# Patient Record
Sex: MALE | Race: WHITE | HISPANIC OR LATINO | Employment: FULL TIME | URBAN - METROPOLITAN AREA
[De-identification: names, ages, dates, MRNs, and addresses within clinical notes are randomized per-mention and may not be internally consistent; named-entity substitution may affect disease eponyms.]

---

## 2017-10-03 ENCOUNTER — HOSPITAL ENCOUNTER (EMERGENCY)
Facility: MEDICAL CENTER | Age: 27
End: 2017-10-03
Attending: EMERGENCY MEDICINE
Payer: COMMERCIAL

## 2017-10-03 VITALS
WEIGHT: 237.88 LBS | OXYGEN SATURATION: 95 % | DIASTOLIC BLOOD PRESSURE: 84 MMHG | BODY MASS INDEX: 30.53 KG/M2 | RESPIRATION RATE: 16 BRPM | TEMPERATURE: 98.5 F | SYSTOLIC BLOOD PRESSURE: 176 MMHG | HEIGHT: 74 IN | HEART RATE: 60 BPM

## 2017-10-03 VITALS
TEMPERATURE: 98.7 F | RESPIRATION RATE: 16 BRPM | OXYGEN SATURATION: 96 % | HEIGHT: 74 IN | HEART RATE: 55 BPM | WEIGHT: 236.77 LBS | BODY MASS INDEX: 30.39 KG/M2 | DIASTOLIC BLOOD PRESSURE: 69 MMHG | SYSTOLIC BLOOD PRESSURE: 135 MMHG

## 2017-10-03 DIAGNOSIS — M54.31 RIGHT SIDED SCIATICA: ICD-10-CM

## 2017-10-03 DIAGNOSIS — M54.42 ACUTE RIGHT-SIDED LOW BACK PAIN WITH LEFT-SIDED SCIATICA: ICD-10-CM

## 2017-10-03 DIAGNOSIS — M54.16 RIGHT LUMBAR RADICULOPATHY: ICD-10-CM

## 2017-10-03 PROCEDURE — 20552 NJX 1/MLT TRIGGER POINT 1/2: CPT

## 2017-10-03 PROCEDURE — A9270 NON-COVERED ITEM OR SERVICE: HCPCS | Performed by: EMERGENCY MEDICINE

## 2017-10-03 PROCEDURE — 700102 HCHG RX REV CODE 250 W/ 637 OVERRIDE(OP): Performed by: EMERGENCY MEDICINE

## 2017-10-03 PROCEDURE — 99283 EMERGENCY DEPT VISIT LOW MDM: CPT

## 2017-10-03 PROCEDURE — 700111 HCHG RX REV CODE 636 W/ 250 OVERRIDE (IP): Performed by: EMERGENCY MEDICINE

## 2017-10-03 PROCEDURE — 99284 EMERGENCY DEPT VISIT MOD MDM: CPT

## 2017-10-03 PROCEDURE — 700101 HCHG RX REV CODE 250: Performed by: EMERGENCY MEDICINE

## 2017-10-03 RX ORDER — CYCLOBENZAPRINE HCL 10 MG
10 TABLET ORAL ONCE
Status: COMPLETED | OUTPATIENT
Start: 2017-10-03 | End: 2017-10-03

## 2017-10-03 RX ORDER — LIDOCAINE 50 MG/G
1 PATCH TOPICAL EVERY 24 HOURS
Qty: 10 PATCH | Refills: 0 | Status: SHIPPED | OUTPATIENT
Start: 2017-10-03 | End: 2017-10-03

## 2017-10-03 RX ORDER — BUPIVACAINE HYDROCHLORIDE 2.5 MG/ML
10 INJECTION, SOLUTION EPIDURAL; INFILTRATION; INTRACAUDAL ONCE
Status: COMPLETED | OUTPATIENT
Start: 2017-10-03 | End: 2017-10-03

## 2017-10-03 RX ORDER — OMEPRAZOLE 40 MG/1
40 CAPSULE, DELAYED RELEASE ORAL DAILY
Qty: 10 CAP | Refills: 0 | Status: SHIPPED | OUTPATIENT
Start: 2017-10-03 | End: 2017-10-03

## 2017-10-03 RX ORDER — HYDROCODONE BITARTRATE AND ACETAMINOPHEN 5; 325 MG/1; MG/1
1 TABLET ORAL ONCE
Status: COMPLETED | OUTPATIENT
Start: 2017-10-03 | End: 2017-10-03

## 2017-10-03 RX ORDER — NAPROXEN 250 MG/1
500 TABLET ORAL ONCE
Status: COMPLETED | OUTPATIENT
Start: 2017-10-03 | End: 2017-10-03

## 2017-10-03 RX ORDER — HYDROCODONE BITARTRATE AND ACETAMINOPHEN 5; 325 MG/1; MG/1
1-2 TABLET ORAL EVERY 4 HOURS PRN
Qty: 20 TAB | Refills: 0 | Status: SHIPPED | OUTPATIENT
Start: 2017-10-03 | End: 2017-10-16

## 2017-10-03 RX ORDER — LIDOCAINE 50 MG/G
PATCH TOPICAL
Status: COMPLETED
Start: 2017-10-03 | End: 2017-10-03

## 2017-10-03 RX ORDER — PREDNISONE 20 MG/1
40 TABLET ORAL DAILY
Qty: 8 TAB | Refills: 0 | Status: SHIPPED | OUTPATIENT
Start: 2017-10-03 | End: 2017-10-07

## 2017-10-03 RX ORDER — LIDOCAINE 50 MG/G
1 PATCH TOPICAL EVERY 24 HOURS
Status: DISCONTINUED | OUTPATIENT
Start: 2017-10-03 | End: 2017-10-03 | Stop reason: HOSPADM

## 2017-10-03 RX ORDER — CYCLOBENZAPRINE HCL 10 MG
10 TABLET ORAL 3 TIMES DAILY PRN
Qty: 18 TAB | Refills: 0 | Status: SHIPPED | OUTPATIENT
Start: 2017-10-03 | End: 2017-10-16

## 2017-10-03 RX ORDER — NAPROXEN 500 MG/1
500 TABLET ORAL 2 TIMES DAILY WITH MEALS
Qty: 20 TAB | Refills: 0 | Status: SHIPPED | OUTPATIENT
Start: 2017-10-03 | End: 2017-10-03

## 2017-10-03 RX ORDER — PREDNISONE 20 MG/1
50 TABLET ORAL ONCE
Status: COMPLETED | OUTPATIENT
Start: 2017-10-03 | End: 2017-10-03

## 2017-10-03 RX ADMIN — PREDNISONE 50 MG: 20 TABLET ORAL at 10:24

## 2017-10-03 RX ADMIN — NAPROXEN 500 MG: 250 TABLET ORAL at 05:10

## 2017-10-03 RX ADMIN — LIDOCAINE 1 PATCH: 50 PATCH CUTANEOUS at 05:30

## 2017-10-03 RX ADMIN — BUPIVACAINE HYDROCHLORIDE 10 ML: 2.5 INJECTION, SOLUTION EPIDURAL; INFILTRATION; INTRACAUDAL; PERINEURAL at 05:30

## 2017-10-03 RX ADMIN — HYDROCODONE BITARTRATE AND ACETAMINOPHEN 1 TABLET: 5; 325 TABLET ORAL at 10:25

## 2017-10-03 RX ADMIN — CYCLOBENZAPRINE HYDROCHLORIDE 10 MG: 10 TABLET, FILM COATED ORAL at 10:24

## 2017-10-03 ASSESSMENT — PAIN SCALES - GENERAL
PAINLEVEL_OUTOF10: 8
PAINLEVEL_OUTOF10: 4

## 2017-10-03 ASSESSMENT — ENCOUNTER SYMPTOMS
VOMITING: 0
SEIZURES: 0
FOCAL WEAKNESS: 0
BACK PAIN: 1
LOSS OF CONSCIOUSNESS: 0
SHORTNESS OF BREATH: 0
FEVER: 0
NAUSEA: 1
DIARRHEA: 0
FALLS: 0
TINGLING: 1
HEADACHES: 0
DIZZINESS: 0
ABDOMINAL PAIN: 0

## 2017-10-03 NOTE — ED NOTES
Pt discharged with written and oral discharge instructions. Pt verbalized understanding of all instructions given. All questions answered. Follow up information provided. Pt ambulating independently to lobby with wife. No s/s of distress.

## 2017-10-03 NOTE — ED NOTES
Pt D/C to home. VSS. D/C instructions, work notes, and prescriptions given to patient. Pt verbalizes understanding. Pt leaves ED with no acute changes, complaints or concerns. Pt ambulated out with a steady gait and all belongings.

## 2017-10-03 NOTE — ED PROVIDER NOTES
ED Provider Note    CHIEF COMPLAINT  Chief Complaint   Patient presents with   • Hip Pain     right hip started hurting yesterday while I was at work. No injury noted. Pain starts in hip and shoots down rt leg. Standing on it makes it worse, then it turns numb.   • Back Pain     x 2 weeks.      HPI    Primary care provider: Pcp Pt States None  Means of arrival: walk-in  History obtained from: patient and wife  History limited by: kelly Bowen is a 27 y.o. male who presents with low back pain and R leg pain.    Location: R low back    Quality: dull pain in the back, sharp pains down the leg    Severity: at worst moderate, at present mild    Duration: onset 2 weeks ago, insidious    Timing: intermittent, worse with activity and relieved with rest    Context: no injuries. Works at [employer] as a .     Modifying factors: minimal relief with ibuprofen yesterday. Worse with activity.     Associated signs/symptoms: associated with pain and paresthesias down the R leg that are transient. Pertinent negatives include no fevers, sweats, trauma, IVDU, incontinence, saddle anesthesia, weakness, falls.      REVIEW OF SYSTEMS  Constitutional: Negative for fever and chills.   HENT: Negative for headache.    Respiratory: Negative for cough or SOB.    Cardiovascular: Negative for chest pain or palpitations.   Gastrointestinal: Negative for nausea or vomiting.   Genitourinary: Negative for dysuria or incontinence or inability to void.  Musculoskeletal: + back pain and R leg pain  Skin: Negative for itching and rash.   Neurological: Negative for weakness, + occasional paresthesias    See HPI for further details. All other systems are negative.     PAST MEDICAL HISTORY   pt denies significant PMH.    PAST FAMILY HISTORY  History reviewed. No pertinent family history.    SOCIAL HISTORY  Social History     Social History Main Topics   • Smoking status: Never Smoker   • Smokeless tobacco: Never Used   • Alcohol use Yes       "Comment: rarely   • Drug use: No   • Sexual activity: Not on file     SURGICAL HISTORY  patient denies any surgical history    CURRENT MEDICATIONS  Home Medications     Reviewed by Geeta Mathew R.N. (Registered Nurse) on 10/03/17 at 0450  Med List Status: Complete   Medication Last Dose Status   ibuprofen (MOTRIN) 800 MG Tab prn Active              ALLERGIES  No Known Allergies    PHYSICAL EXAM  VITAL SIGNS: BP (!) 176/84   Pulse 60   Temp 36.9 °C (98.5 °F)   Resp 16   Ht 1.88 m (6' 2\")   Wt 107.9 kg (237 lb 14 oz)   SpO2 95%   BMI 30.54 kg/m²    Pulse ox interpretation: On room air, I interpret this pulse ox as normal.  Constitutional: Well developed, well nourished. No acute distress. Sitting up.   HEENT: Normocephalic, atraumatic.   Eyes:  Normal sclerae, nonicteric.  Neck: Supple, Full range of motion, nontender.  Chest/Pulmonary: Clear to ausculation bilaterally, no wheezes or rhonchi.  Cardio: Regular rate and rhythm, no murmur.   Abdomen: Soft, nontender, no rebound, guarding, or masses.  Back: No CVA tenderness, nontender midline, no step offs. R lumbar paraspinous minimal tenderness to palpation.  Musculoskeletal: No deformity, no edema, neurovascular intact.   Neuro: Clear speech, appropriate, cooperative, cranial nerves II-XII grossly intact. No saddle anesthesia. downstroking toes, proprioception intact b/l feet, no clonus, DTRs 2+ and symmetric.  Psych: Normal mood and affect.    DIAGNOSTIC STUDIES / PROCEDURES    PROCEDURES    Trigger Point Injection Procedure Note    Indication: pain control    Procedure: The patient was placed in the appropriate position and the area over the trigger point was prepped with chlorhexidine and draped in a sterile fashion. Injection was performed into the trigger point area using 0.25% bupivacaine, a total of 6cc was injected at 3 sites of Paraspinous point tenderness.. The injection site was covered with a 5% lidocaine patch.    The patient tolerated the " procedure well.    Complications: None    COURSE & MEDICAL DECISION MAKING    This is a 27 y.o. male who presents with back pain worsening for two weeks now with pain down the right leg and occasional paresthesias.    Differential Diagnosis includes but is not limited to:  Sciatica, fracture, cauda equina, vascular disease, abscess    ED Course:  No red flag symptoms suggesting malignancy, abscess, or cauda equina.   No trauma doubt fracture.  Plan local pain control with trigger points, lidocaine patch, and systemic relief with nsaids.   Naproxen PO tolerated.  Trigger point performed, see procedure note.  D/w patient my concerns for sciatica and probable disc bulge. Referred to a pcp to manage his elevated blood pressure, preventive health screening, and for possible referral for MRI if outpatient nsaids and lidocaine patch don't relieve his symptoms. Rxs for naproxen, lidocaine, and omeprazole for GI ppx given; work note provided for two days.   All questions answers, return precautions and red flag symptom warnings given.  Patient and wife both understand and will f/u with a PCP today.     Medications   lidocaine (LIDODERM) 5 % 1 Patch (not administered)   bupivacaine 0.25% (SENSORCAINE-MARCAINE) pf injection 10 mL (10 mL Injection Given 10/3/17 0530)   naproxen (NAPROSYN) tablet 500 mg (500 mg Oral Given 10/3/17 0510)     FINAL IMPRESSION  1. Acute right-sided low back pain with left-sided sciatica      PRESCRIPTIONS  New Prescriptions    LIDOCAINE (LIDODERM) 5 % PATCH    Apply 1 Patch to skin as directed every 24 hours.    NAPROXEN (NAPROSYN) 500 MG TAB    Take 1 Tab by mouth 2 times a day, with meals for 10 days.    OMEPRAZOLE (PRILOSEC) 40 MG DELAYED-RELEASE CAPSULE    Take 1 Cap by mouth every day for 10 days.     FOLLOW UP  [address]  [phone]    Schedule an appointment as soon as possible for a visit in 1 day      [facility], Emergency  Dept  [address]  [place] [address]  [phone]  Today  If symptoms worsen    -DISCHARGE-     Results, exam findings, clinical impression, presumed diagnosis, treatment options, and strict return precautions were discussed with the patient and family, and they verbalized understanding, agreed with, and appreciated the plan of care.    I personally reviewed and verified the patient's nursing notes, as well as past medical, surgical, and social history.     Portions of this record were made with voice recognition software.  Despite my review, spelling/grammar/context errors may still remain.  Interpretation of this chart should be taken in this context.    Electronically signed by Barrera Peters on 10/3/2017 at 5:43 AM.

## 2017-10-03 NOTE — ED NOTES
Med rec complete per Pt at bedside  Pt was not able to  medications  He was discharged with this AM  Allergies reviewed  No ABX in last 30 days

## 2017-10-03 NOTE — DISCHARGE INSTRUCTIONS
You were seen and evaluated in the Emergency Department at [facility] for:     Low back pain radiating down you right leg. This is likely sciatica. Please follow-up with a primary care doctor TODAY as you may need a referral for an MRI.     You received the following medications:    Naproxen, bupivacaine injection, lidocaine patch.    You received the following prescriptions:    Bupivacaine, omeprazole, lidocaine patches. Use these for ten days for symptom control.  ----------------------------    Please make sure to follow up with:    A primary care doctor today  Return to the ER immediately for weakness, fevers, persistent numbness, uncontrolled pain, or any other concerns.    Good luck, and feel better!  ----------------------------    We always encourage patients to return IMMEDIATELY if they have:  Increased or changing pain, passing out, fevers over 100.4 (taken in your mouth or rectally) for more than 2 days, redness or swelling of skin or tissues, feeling like your heart is beating fast, chest pain that is new or worsening, trouble breathing, feeling like your throat is closing up and can not breath, inability to walk, weakness of any part of your body, new dizziness, severe bleeding that won't stop from any part of your body, if you can't eat or drink, or if you have any other concerns.   If you feel worse, please know that you can always return with any questions, concerns, worse symptoms, or you are feeling unsafe. We certainly cannot say for sure that we have ruled out every illness or dangerous disease, but we feel that at this specific time, your exam, tests, and vital signs like heart rate and blood pressure are safe for discharge.         Sciatica  Sciatica is pain, weakness, numbness, or tingling along the path of the sciatic nerve. The nerve starts in the lower back and runs down the back of each leg. The nerve controls the muscles in the lower leg and in the back of the knee, while also  providing sensation to the back of the thigh, lower leg, and the sole of your foot. Sciatica is a symptom of another medical condition. For instance, nerve damage or certain conditions, such as a herniated disk or bone spur on the spine, pinch or put pressure on the sciatic nerve. This causes the pain, weakness, or other sensations normally associated with sciatica. Generally, sciatica only affects one side of the body.  CAUSES   · Herniated or slipped disc.  · Degenerative disk disease.  · A pain disorder involving the narrow muscle in the buttocks (piriformis syndrome).  · Pelvic injury or fracture.  · Pregnancy.  · Tumor (rare).  SYMPTOMS   Symptoms can vary from mild to very severe. The symptoms usually travel from the low back to the buttocks and down the back of the leg. Symptoms can include:  · Mild tingling or dull aches in the lower back, leg, or hip.  · Numbness in the back of the calf or sole of the foot.  · Burning sensations in the lower back, leg, or hip.  · Sharp pains in the lower back, leg, or hip.  · Leg weakness.  · Severe back pain inhibiting movement.  These symptoms may get worse with coughing, sneezing, laughing, or prolonged sitting or standing. Also, being overweight may worsen symptoms.  DIAGNOSIS   Your caregiver will perform a physical exam to look for common symptoms of sciatica. He or she may ask you to do certain movements or activities that would trigger sciatic nerve pain. Other tests may be performed to find the cause of the sciatica. These may include:  · Blood tests.  · X-rays.  · Imaging tests, such as an MRI or CT scan.  TREATMENT   Treatment is directed at the cause of the sciatic pain. Sometimes, treatment is not necessary and the pain and discomfort goes away on its own. If treatment is needed, your caregiver may suggest:  · Over-the-counter medicines to relieve pain.  · Prescription medicines, such as anti-inflammatory medicine, muscle relaxants, or narcotics.  · Applying  heat or ice to the painful area.  · Steroid injections to lessen pain, irritation, and inflammation around the nerve.  · Reducing activity during periods of pain.  · Exercising and stretching to strengthen your abdomen and improve flexibility of your spine. Your caregiver may suggest losing weight if the extra weight makes the back pain worse.  · Physical therapy.  · Surgery to eliminate what is pressing or pinching the nerve, such as a bone spur or part of a herniated disk.  HOME CARE INSTRUCTIONS   · Only take over-the-counter or prescription medicines for pain or discomfort as directed by your caregiver.  · Apply ice to the affected area for 20 minutes, 3-4 times a day for the first 48-72 hours. Then try heat in the same way.  · Exercise, stretch, or perform your usual activities if these do not aggravate your pain.  · Attend physical therapy sessions as directed by your caregiver.  · Keep all follow-up appointments as directed by your caregiver.  · Do not wear high heels or shoes that do not provide proper support.  · Check your mattress to see if it is too soft. A firm mattress may lessen your pain and discomfort.  SEEK IMMEDIATE MEDICAL CARE IF:   · You lose control of your bowel or bladder (incontinence).  · You have increasing weakness in the lower back, pelvis, buttocks, or legs.  · You have redness or swelling of your back.  · You have a burning sensation when you urinate.  · You have pain that gets worse when you lie down or awakens you at night.  · Your pain is worse than you have experienced in the past.  · Your pain is lasting longer than 4 weeks.  · You are suddenly losing weight without reason.  MAKE SURE YOU:  · Understand these instructions.  · Will watch your condition.  · Will get help right away if you are not doing well or get worse.     This information is not intended to replace advice given to you by your health care provider. Make sure you discuss any questions you have with your health  care provider.     Document Released: 12/12/2002 Document Revised: 06/18/2013 Document Reviewed: 04/28/2013  [employer] Interactive Patient Education ©2016 [employer]      Back Exercises  Back exercises help treat and prevent back injuries. The goal is to increase your strength in your belly (abdominal) and back muscles. These exercises can also help with flexibility. Start these exercises when told by your doctor.  HOME CARE  Back exercises include:  Pelvic Tilt.  · Lie on your back with your knees bent. Tilt your pelvis until the lower part of your back is against the floor. Hold this position 5 to 10 sec. Repeat this exercise 5 to 10 times.  Knee to Chest.  · Pull 1 knee up against your chest and hold for 20 to 30 seconds. Repeat this with the other knee. This may be done with the other leg straight or bent, whichever feels better. Then, pull both knees up against your chest.  Sit-Ups or Curl-Ups.  · Bend your knees 90 degrees. Start with tilting your pelvis, and do a partial, slow sit-up. Only lift your upper half 30 to 45 degrees off the floor. Take at least 2 to 3 seonds for each sit-up. Do not do sit-ups with your knees out straight. If partial sit-ups are difficult, simply do the above but with only tightening your belly (abdominal) muscles and holding it as told.  Hip-Lift.  · Lie on your back with your knees flexed 90 degrees. Push down with your feet and shoulders as you raise your hips 2 inches off the floor. Hold for 10 seconds, repeat 5 to 10 times.  Back Arches.  · Lie on your stomach. Prop yourself up on bent elbows. Slowly press on your hands, causing an arch in your low back. Repeat 3 to 5 times.  Shoulder-Lifts.  · Lie face down with arms beside your body. Keep hips and belly pressed to floor as you slowly lift your head and shoulders off the floor.  Do not overdo your exercises. Be careful in the beginning. Exercises may cause you some mild back discomfort. If the pain lasts for more than 15  minutes, stop the exercises until you see your doctor. Improvement with exercise for back problems is slow.      This information is not intended to replace advice given to you by your health care provider. Make sure you discuss any questions you have with your health care provider.     Document Released: 01/20/2012 Document Revised: 03/11/2013 Document Reviewed: 02/11/2016  [employer] Interactive Patient Education ©2016 [employer]

## 2017-10-03 NOTE — DISCHARGE INSTRUCTIONS
Sciatica  Sciatica is pain, weakness, numbness, or tingling along the path of the sciatic nerve. The nerve starts in the lower back and runs down the back of each leg. The nerve controls the muscles in the lower leg and in the back of the knee, while also providing sensation to the back of the thigh, lower leg, and the sole of your foot. Sciatica is a symptom of another medical condition. For instance, nerve damage or certain conditions, such as a herniated disk or bone spur on the spine, pinch or put pressure on the sciatic nerve. This causes the pain, weakness, or other sensations normally associated with sciatica. Generally, sciatica only affects one side of the body.  CAUSES   · Herniated or slipped disc.  · Degenerative disk disease.  · A pain disorder involving the narrow muscle in the buttocks (piriformis syndrome).  · Pelvic injury or fracture.  · Pregnancy.  · Tumor (rare).  SYMPTOMS   Symptoms can vary from mild to very severe. The symptoms usually travel from the low back to the buttocks and down the back of the leg. Symptoms can include:  · Mild tingling or dull aches in the lower back, leg, or hip.  · Numbness in the back of the calf or sole of the foot.  · Burning sensations in the lower back, leg, or hip.  · Sharp pains in the lower back, leg, or hip.  · Leg weakness.  · Severe back pain inhibiting movement.  These symptoms may get worse with coughing, sneezing, laughing, or prolonged sitting or standing. Also, being overweight may worsen symptoms.  DIAGNOSIS   Your caregiver will perform a physical exam to look for common symptoms of sciatica. He or she may ask you to do certain movements or activities that would trigger sciatic nerve pain. Other tests may be performed to find the cause of the sciatica. These may include:  · Blood tests.  · X-rays.  · Imaging tests, such as an MRI or CT scan.  TREATMENT   Treatment is directed at the cause of the sciatic pain. Sometimes, treatment is not necessary  and the pain and discomfort goes away on its own. If treatment is needed, your caregiver may suggest:  · Over-the-counter medicines to relieve pain.  · Prescription medicines, such as anti-inflammatory medicine, muscle relaxants, or narcotics.  · Applying heat or ice to the painful area.  · Steroid injections to lessen pain, irritation, and inflammation around the nerve.  · Reducing activity during periods of pain.  · Exercising and stretching to strengthen your abdomen and improve flexibility of your spine. Your caregiver may suggest losing weight if the extra weight makes the back pain worse.  · Physical therapy.  · Surgery to eliminate what is pressing or pinching the nerve, such as a bone spur or part of a herniated disk.  HOME CARE INSTRUCTIONS   · Only take over-the-counter or prescription medicines for pain or discomfort as directed by your caregiver.  · Apply ice to the affected area for 20 minutes, 3-4 times a day for the first 48-72 hours. Then try heat in the same way.  · Exercise, stretch, or perform your usual activities if these do not aggravate your pain.  · Attend physical therapy sessions as directed by your caregiver.  · Keep all follow-up appointments as directed by your caregiver.  · Do not wear high heels or shoes that do not provide proper support.  · Check your mattress to see if it is too soft. A firm mattress may lessen your pain and discomfort.  SEEK IMMEDIATE MEDICAL CARE IF:   · You lose control of your bowel or bladder (incontinence).  · You have increasing weakness in the lower back, pelvis, buttocks, or legs.  · You have redness or swelling of your back.  · You have a burning sensation when you urinate.  · You have pain that gets worse when you lie down or awakens you at night.  · Your pain is worse than you have experienced in the past.  · Your pain is lasting longer than 4 weeks.  · You are suddenly losing weight without reason.  MAKE SURE YOU:  · Understand these  instructions.  · Will watch your condition.  · Will get help right away if you are not doing well or get worse.     This information is not intended to replace advice given to you by your health care provider. Make sure you discuss any questions you have with your health care provider.     Document Released: 12/12/2002 Document Revised: 06/18/2013 Document Reviewed: 04/28/2013  [employer] Interactive Patient Education ©2016 [employer]

## 2017-10-03 NOTE — ED PROVIDER NOTES
ED Provider Note    ED Provider Note    Scribed for Kathi Hansen D.O. by Kathi Hansen. 10/3/2017, 9:34 AM.    Primary care provider: Pcp Pt States None  Means of arrival: POV  History obtained from: Patient/SO  History limited by: None    CHIEF COMPLAINT  Chief Complaint   Patient presents with   • Back Pain     with sciatica       HPI  Miguel Bowen is a 27 y.o. male who presents to the Emergency DepartmentWith his significant other with a chief complaint of right shin pain. Patient states he was here earlier today at approximately 4 in the morning with similar symptoms. He was discharged to home with medications but he has not yet been able to fill them. States he got home and could not find a comfortable position, pain worsened prompting him to come back to the emergency department. No fever. No history of IV drug use. No history of diabetes or hypertension. No immune compromising condition such as cancer, chemotherapy treatments. He has no past medical history and no past surgical history. Only takes ibuprofen. He states he injured his back about 10 years ago lifting weights while at school. As part of his work, he lifts heavy things on a daily basis and he does report that occasionally his back flares up on him. He states had right-sided back pain for about 2 weeks. Initially, he had radiation of the pain down his right thigh and now it's progressed to include his right calf and lower extremity. Now is complaining of some decreased sensation to his right thigh and circumferentially, from his knees distally. He denies any bowel or bladder incontinence or retention. No saddle paresthesias. He is able to walk. He has no weakness in his lower extremities. Patient complains of nausea yesterday but other than the symptoms described, his ROS was overall negative.    REVIEW OF SYSTEMS  Review of Systems   Constitutional: Negative for fever.   Respiratory: Negative for shortness of breath.    Cardiovascular:  "Negative for chest pain.   Gastrointestinal: Positive for nausea. Negative for abdominal pain, diarrhea and vomiting.   Genitourinary: Negative for dysuria.   Musculoskeletal: Positive for back pain. Negative for falls.   Skin: Negative for rash.   Neurological: Positive for tingling. Negative for dizziness, focal weakness, seizures, loss of consciousness and headaches.   All other systems reviewed and are negative.      PAST MEDICAL HISTORY   history of prior back injury about 10 years ago    SURGICAL HISTORY  patient denies any surgical history    SOCIAL HISTORY  Social History   Substance Use Topics   • Smoking status: Never Smoker   • Smokeless tobacco: Never Used   • Alcohol use Yes      Comment: rarely      History   Drug Use No       FAMILY HISTORY  No family history on file.    CURRENT MEDICATIONS  Home Medications     Reviewed by Valdez Reardon (Pharmacy Tech) on 10/03/17 at 0920  Med List Status: Complete   Medication Last Dose Status        Patient Logan Taking any Medications                       ALLERGIES  No Known Allergies    PHYSICAL EXAM  VITAL SIGNS: /69   Pulse (!) 55   Temp 37.1 °C (98.7 °F)   Resp 16   Ht 1.88 m (6' 2\")   Wt 107.4 kg (236 lb 12.4 oz)   SpO2 100%   BMI 30.40 kg/m²   Vitals reviewed.  Constitutional: Patient is oriented to person, place, and time. Appears well-developed and well-nourished. No distress.    Head: Normocephalic and atraumatic.   Ears: Normal external ears bilaterally.   Eyes: Conjunctivae are normal.  Cardiovascular: Normal rate, regular rhythm and normal heart sounds. Normal peripheral pulses, Bilateral lower extremities.  Pulmonary/Chest: Effort normal and breath sounds normal. No respiratory distress, no wheezes, rhonchi, or rales.   Abdominal: Soft. Bowel sounds are normal. There is no tenderness, rebound or guarding, or peritoneal signs. No CVA tenderness.  Musculoskeletal: No edema and no tenderness, except to the right low back, lower " lumbar/sacral area..   Neurological: CN II through XII intact. Normal strength of his bilateral lower extremities. There is decreased sensation primarily to his left lower extremity circumferentially around the area from the knee down and to a lesser extent, to his anterior thigh. No Babinski. No clonus.  Skin: Skin is warm and dry. No erythema. No pallor. Lidoderm patch over the right low back.  Psychiatric: Patient has a normal mood and affect.     COURSE & MEDICAL DECISION MAKING  Pertinent Labs & Imaging studies reviewed. (See chart for details)    Obtained and reviewed past medical records. I reviewed the notes from the visit earlier today. Patient presented with right hip pain that started hurting while he was at work. He notes no injury. He reports pain shooting down his right hip to his right leg. Standing makes it worse. He was given trigger point injections. Working diagnosis was sciatica and possibly disc pathology.    9:34 AM - Patient seen and examined at bedside. Condition is no saddle paresthesias and no weakness to his leg. He does have some paresthesias. I suspect that this is likely sciatica or lumbar radiculopathy possibly disc herniation. At this time, he treated with anti-inflammatories, prednisone as well as pain medication.     11:10 AM Patient was reevaluated. He is feeling better. Discussed with him and review of the previous chart. I am in agreement, that this is likely sciatica or possibly disc herniation although he has no signs or symptoms to suggest emergent need for MRI or intervention. I think he could benefit from a short course of steroids for inflammation. He will establish care with a primary care doctor and seek an MRI as an outpatient. He'll be discharged home with a muscle relaxer, pain medication as well. He is well-appearing and nontoxic. He'll be discharged home in stable condition.      FINAL IMPRESSION  1. Right lumbar radiculopathy    2. Right sided sciatica

## 2017-10-05 ENCOUNTER — OFFICE VISIT (OUTPATIENT)
Dept: MEDICAL GROUP | Facility: MEDICAL CENTER | Age: 27
End: 2017-10-05
Payer: COMMERCIAL

## 2017-10-05 VITALS
TEMPERATURE: 98.1 F | WEIGHT: 238 LBS | OXYGEN SATURATION: 96 % | HEIGHT: 75 IN | BODY MASS INDEX: 29.59 KG/M2 | HEART RATE: 72 BPM | DIASTOLIC BLOOD PRESSURE: 78 MMHG | SYSTOLIC BLOOD PRESSURE: 120 MMHG | RESPIRATION RATE: 16 BRPM

## 2017-10-05 DIAGNOSIS — M54.41 ACUTE RIGHT-SIDED LOW BACK PAIN WITH RIGHT-SIDED SCIATICA: ICD-10-CM

## 2017-10-05 DIAGNOSIS — Z76.89 ENCOUNTER TO ESTABLISH CARE: ICD-10-CM

## 2017-10-05 PROCEDURE — 99214 OFFICE O/P EST MOD 30 MIN: CPT | Performed by: PHYSICIAN ASSISTANT

## 2017-10-05 RX ORDER — OMEPRAZOLE 40 MG/1
40 CAPSULE, DELAYED RELEASE ORAL DAILY
COMMUNITY
End: 2017-10-16

## 2017-10-05 RX ORDER — NAPROXEN 500 MG/1
500 TABLET ORAL 2 TIMES DAILY WITH MEALS
COMMUNITY
End: 2017-10-16

## 2017-10-05 ASSESSMENT — PATIENT HEALTH QUESTIONNAIRE - PHQ9: CLINICAL INTERPRETATION OF PHQ2 SCORE: 0

## 2017-10-05 NOTE — ASSESSMENT & PLAN NOTE
This is a 27-year-old male who complains of a four-day history of right sided, low back pain. States he woke up on Monday morning with symptoms. Denies any trauma. He does work on a loading dock. He was seen initially at the ED and then returned as well. Initially he was given a shot into his low back which was not effective. Later on he was given Flexeril, Norco, naproxen and prednisone course. He states that medication is helpful. The concern is that pain radiates down to his shin. Shin pain has improved. He does state that there is numbness down the leg. He states that the leg feels weak. He's had back pain in the past. He is currently not going to work. He also drives for a living. Requesting a note to return to work on Monday.

## 2017-10-05 NOTE — LETTER
October 5, 2017         Patient: Miguel Bowen   YOB: 1990   Date of Visit: 10/5/2017           To Whom it May Concern:    Miguel Bowen was seen in my clinic on 10/5/2017. He may return to work on 10/9/17.    If you have any questions or concerns, please don't hesitate to call.        Sincerely,           Eron Saenz P.A.-C.  Electronically Signed

## 2017-10-05 NOTE — PROGRESS NOTES
"Subjective:   Miguel Bowen is a 27 y.o. male here today for wow back pain with sciatica for almost 4 days.    Acute right-sided low back pain with right-sided sciatica  This is a 27-year-old male who complains of a four-day history of right sided, low back pain. States he woke up on Monday morning with symptoms. Denies any trauma. He does work on a loading dock. He was seen initially at the ED and then returned as well. Initially he was given a shot into his low back which was not effective. Later on he was given Flexeril, Norco, naproxen and prednisone course. He states that medication is helpful. The concern is that pain radiates down to his shin. Shin pain has improved. He does state that there is numbness down the leg. He states that the leg feels weak. He's had back pain in the past. He is currently not going to work. He also drives for a living. Requesting a note to return to work on Monday.       Current medicines (including changes today)  Current Outpatient Prescriptions   Medication Sig Dispense Refill   • omeprazole (PRILOSEC) 40 MG delayed-release capsule Take 40 mg by mouth every day.     • naproxen (NAPROSYN) 500 MG Tab Take 500 mg by mouth 2 times a day, with meals.     • predniSONE (DELTASONE) 20 MG Tab Take 2 Tabs by mouth every day for 4 days. 8 Tab 0   • cyclobenzaprine (FLEXERIL) 10 MG Tab Take 1 Tab by mouth 3 times a day as needed for Moderate Pain or Muscle Spasms. 18 Tab 0   • hydrocodone-acetaminophen (NORCO) 5-325 MG Tab per tablet Take 1-2 Tabs by mouth every four hours as needed. 20 Tab 0     No current facility-administered medications for this visit.      He  has no past medical history on file.    ROS   No chest pain, no shortness of breath, no abdominal pain and all other systems were reviewed and are negative.       Objective:     Blood pressure 120/78, pulse 72, temperature 36.7 °C (98.1 °F), resp. rate 16, height 1.899 m (6' 2.75\"), weight 108 kg (238 lb), SpO2 96 %. Body mass " index is 29.95 kg/m².   Physical Exam:  Constitutional: Alert, no distress.  Skin: Warm, dry, good turgor, no rashes in visible areas.  Eye: Equal, round and reactive, conjunctiva clear, lids normal.  ENMT: Lips without lesions, good dentition, oropharynx clear.  Neck: Trachea midline, no masses.   Lymph: No cervical or supraclavicular lymphadenopathy  Respiratory: Unlabored respiratory effort, lungs appear clear, no wheezes.  Cardiovascular: Normal S1, S2, no murmur, no edema.  Musculoskeletal: No spinal or paraspinal tenderness. Muscle strength bilateral lower extremities at 5 out of 5. Right knee DTR negligible. Straight leg test negative.  Psych: Alert and oriented x3, normal affect and mood.        Assessment and Plan:   The following treatment plan was discussed    1. Acute right-sided low back pain with right-sided sciatica  Acute, new onset condition. He was requesting MR of his lumbar spine. Ordered without contrast. Advised to continue medications given by EGD. Take naproxen with food. Expect symptoms to gradually improve. In the future he may contact me if he requests a referral to see physical therapy. Advised to do stretching exercises. Discussed with proper lifting techniques.  - MR-LUMBAR SPINE-W/O; Future    2. Encounter to establish care      Followup: No Follow-up on file.    Please note that this dictation was created using voice recognition software. I have made every reasonable attempt to correct obvious errors, but I expect that there are errors of grammar and possibly content that I did not discover before finalizing the note.

## 2017-10-16 ENCOUNTER — OFFICE VISIT (OUTPATIENT)
Dept: MEDICAL GROUP | Facility: MEDICAL CENTER | Age: 27
End: 2017-10-16
Payer: COMMERCIAL

## 2017-10-16 VITALS
TEMPERATURE: 97.8 F | HEIGHT: 75 IN | SYSTOLIC BLOOD PRESSURE: 118 MMHG | RESPIRATION RATE: 16 BRPM | BODY MASS INDEX: 29.97 KG/M2 | OXYGEN SATURATION: 95 % | DIASTOLIC BLOOD PRESSURE: 76 MMHG | WEIGHT: 241 LBS | HEART RATE: 74 BPM

## 2017-10-16 DIAGNOSIS — M54.41 ACUTE RIGHT-SIDED LOW BACK PAIN WITH RIGHT-SIDED SCIATICA: ICD-10-CM

## 2017-10-16 DIAGNOSIS — E66.9 OBESITY (BMI 30-39.9): ICD-10-CM

## 2017-10-16 PROCEDURE — 99214 OFFICE O/P EST MOD 30 MIN: CPT | Performed by: PHYSICIAN ASSISTANT

## 2017-10-16 NOTE — ASSESSMENT & PLAN NOTE
This is a 27-year-old male who returns today to have a form completed. The form is from his employer at [employer]. They are requiring evaluation and follow-up regarding his right sided back pain with sciatica that began earlier in the month. Symptoms have improved a little bit. Denies significant pain in the back the lower right aspect but this still is pain at times. Concern is there is still numbness of his lower distal extremity. Numbness is apparent while running because he cannot feel the foot hit the ground. It feels unstable. He states that the pain at the lower leg has improved. He is not currently on any medication. He's been doing stretching exercises that were advised last time. He also has an appointment today with chiropractic care. His MR of his lumbar spine is scheduled this coming Saturday.

## 2017-10-16 NOTE — PROGRESS NOTES
"Subjective:   Miguel Bowen is a 27 y.o. male here today forFollow-up for right low back pain with sciatica that began at the beginning of the month.    Acute right-sided low back pain with right-sided sciatica  This is a 27-year-old male who returns today to have a form completed. The form is from his employer at the [employer]. They are requiring evaluation and follow-up regarding his right sided back pain with sciatica that began earlier in the month. Symptoms have improved a little bit. Denies significant pain in the back the lower right aspect but this still is pain at times. Concern is there is still numbness of his lower distal extremity. Numbness is apparent while running because he cannot feel the foot hit the ground. It feels unstable. He states that the pain at the lower leg has improved. He is not currently on any medication. He's been doing stretching exercises that were advised last time. He also has an appointment today with chiropractic care. His MR of his lumbar spine is scheduled this coming Saturday.       Current medicines (including changes today)  No current outpatient prescriptions on file.     No current facility-administered medications for this visit.      He  has no past medical history on file.    ROS   No chest pain, no shortness of breath, no abdominal pain and all other systems were reviewed and are negative.       Objective:     Blood pressure 118/76, pulse 74, temperature 36.6 °C (97.8 °F), resp. rate 16, height 1.899 m (6' 2.75\"), weight 109.3 kg (241 lb), SpO2 95 %. Body mass index is 30.32 kg/m².   Physical Exam:  Constitutional: Alert, no distress.  Skin: Warm, dry, good turgor, no rashes in visible areas.  Eye: Equal, round and reactive, conjunctiva clear, lids normal.  ENMT: Lips without lesions, good dentition, oropharynx clear.  Neck: Trachea midline, no masses.   Respiratory: Unlabored respiratory effort, lungs appear clear, no wheezes.  Cardiovascular: " Normal S1, S2, no murmur, no edema.  Musculoskeletal: Bilateral lower extremity muscle strength is 5 out of 5. Range of motion is good.  Neuro: Patellar DTR on the right side is negligible. Left side is 2+.  Psych: Alert and oriented x3, normal affect and mood.        Assessment and Plan:   The following treatment plan was discussed    1. Acute right-sided low back pain with right-sided sciatica  Acute, new onset condition. Form was completed and returned. Await MRI and Saturday. Refer to neurosurgery. Muscle strength is intact which is good. Still concerned with paresthesia of the lower right leg. Follow-up with chiropractic care today. Continue stretching exercises.  - REFERRAL TO NEUROSURGERY    2. Obesity (BMI 30-39.9)  Advised to continue to exercise routinely if possible. He healthier.  - Patient identified as having weight management issue.  Appropriate orders and counseling given.      Followup: Return if symptoms worsen or fail to improve.    Please note that this dictation was created using voice recognition software. I have made every reasonable attempt to correct obvious errors, but I expect that there are errors of grammar and possibly content that I did not discover before finalizing the note.

## 2017-10-21 ENCOUNTER — HOSPITAL ENCOUNTER (OUTPATIENT)
Dept: RADIOLOGY | Facility: MEDICAL CENTER | Age: 27
End: 2017-10-21
Attending: PHYSICIAN ASSISTANT
Payer: COMMERCIAL

## 2017-10-21 DIAGNOSIS — M54.41 ACUTE RIGHT-SIDED LOW BACK PAIN WITH RIGHT-SIDED SCIATICA: ICD-10-CM

## 2017-10-21 PROCEDURE — 72148 MRI LUMBAR SPINE W/O DYE: CPT

## 2017-10-23 ENCOUNTER — TELEPHONE (OUTPATIENT)
Dept: MEDICAL GROUP | Facility: MEDICAL CENTER | Age: 27
End: 2017-10-23

## 2017-10-23 NOTE — TELEPHONE ENCOUNTER
----- Message from Eron Saenz P.A.-C. sent at 10/22/2017  6:57 PM PDT -----  Please contact Miguel.  MRI abnormal.  Would be best to follow-up with Neurosurgery as he was already referred. Contact...    [facility]  [address]  [id number]  Phone: [phone]    May return to discuss results.  Thank you.    Eron

## 2017-10-23 NOTE — TELEPHONE ENCOUNTER
Phone Number Called: [phone] (home)        Message: Left vm for patient to call back.     Left Message for patient to call back: yes

## 2017-10-26 ENCOUNTER — OFFICE VISIT (OUTPATIENT)
Dept: MEDICAL GROUP | Facility: MEDICAL CENTER | Age: 27
End: 2017-10-26
Payer: COMMERCIAL

## 2017-10-26 VITALS
SYSTOLIC BLOOD PRESSURE: 112 MMHG | OXYGEN SATURATION: 98 % | HEART RATE: 66 BPM | DIASTOLIC BLOOD PRESSURE: 80 MMHG | WEIGHT: 230.2 LBS | BODY MASS INDEX: 28.62 KG/M2 | RESPIRATION RATE: 14 BRPM | HEIGHT: 75 IN | TEMPERATURE: 98.3 F

## 2017-10-26 DIAGNOSIS — M54.41 ACUTE RIGHT-SIDED LOW BACK PAIN WITH RIGHT-SIDED SCIATICA: ICD-10-CM

## 2017-10-26 PROBLEM — E66.9 OBESITY (BMI 30-39.9): Status: RESOLVED | Noted: 2017-10-16 | Resolved: 2017-10-26

## 2017-10-26 PROCEDURE — 99213 OFFICE O/P EST LOW 20 MIN: CPT | Performed by: PHYSICIAN ASSISTANT

## 2017-10-26 ASSESSMENT — PAIN SCALES - GENERAL: PAINLEVEL: NO PAIN

## 2017-10-26 NOTE — ASSESSMENT & PLAN NOTE
This is a 27-year-old male who is here today for results of his MRI of his lumbar spine. It showed the following:    Impression       1.  There is large right paracentral disc extrusion at L4-5. The extruded disc fragment migrated upwards and causing severe right lateral recess stenosis. The exiting right L4 nerve root might have been impinged at the lateral recess.  2.  Diffuse disc bulge at L5-S1 without significant spinal or neural foraminal stenosis.     He states that he has minimized his activities. He denies any pain of his lower back or the leg. But his concern is he is not exercising like he used to. He still complains of numbness of the distal right leg. States that feeling has not come back entirely. He has continued to see chiropractic care. He has changed his diet so he is lost weight. Has an appointment next week with [facility] neurology.

## 2017-10-26 NOTE — PROGRESS NOTES
"Subjective:   Miguel Bowen is a 27 y.o. male here today for MRI of lumbar results.    Acute right-sided low back pain with right-sided sciatica  This is a 27-year-old male who is here today for results of his MRI of his lumbar spine. It showed the following:    Impression       1.  There is large right paracentral disc extrusion at L4-5. The extruded disc fragment migrated upwards and causing severe right lateral recess stenosis. The exiting right L4 nerve root might have been impinged at the lateral recess.  2.  Diffuse disc bulge at L5-S1 without significant spinal or neural foraminal stenosis.     He states that he has minimized his activities. He denies any pain of his lower back or the leg. But his concern is he is not exercising like he used to. He still complains of numbness of the distal right leg. States that feeling has not come back entirely. He has continued to see chiropractic care. He has changed his diet so he is lost weight. Has an appointment next week with [facility] neurology.       Current medicines (including changes today)  No current outpatient prescriptions on file.     No current facility-administered medications for this visit.      He  has no past medical history on file.    ROS   No chest pain, no shortness of breath, no abdominal pain and all other systems were reviewed and are negative.       Objective:     Blood pressure 112/80, pulse 66, temperature 36.8 °C (98.3 °F), resp. rate 14, height 1.899 m (6' 2.75\"), weight 104.4 kg (230 lb 3.2 oz), SpO2 98 %. Body mass index is 28.97 kg/m².   Physical Exam:  Constitutional: Alert, no distress.  Skin: Warm, dry, good turgor, no rashes in visible areas.  Eye: Equal, round and reactive, conjunctiva clear, lids normal.  ENMT: Lips without lesions, good dentition, oropharynx clear.  Neck: Trachea midline, no masses.   Lymph: No cervical or supraclavicular lymphadenopathy  Respiratory: Unlabored respiratory effort, lungs appear clear, no " wheezes.  Cardiovascular:Regular rate and rhythm, no edema.  Neuro: Right patellar DTRs 1+.  Psych: Alert and oriented x3, normal affect and mood.        Assessment and Plan:   The following treatment plan was discussed    1. Acute right-sided low back pain with right-sided sciatica  Continued issue. Discussed with MRI results. Follow-up with neurology. Expect that symptoms may improve in time. Patellar reflexes improving. May continue with chiropractic services.      Followup: No Follow-up on file.    Please note that this dictation was created using voice recognition software. I have made every reasonable attempt to correct obvious errors, but I expect that there are errors of grammar and possibly content that I did not discover before finalizing the note.

## 2017-11-30 ENCOUNTER — OFFICE VISIT (OUTPATIENT)
Dept: MEDICAL GROUP | Facility: MEDICAL CENTER | Age: 27
End: 2017-11-30
Payer: COMMERCIAL

## 2017-11-30 VITALS
TEMPERATURE: 97.5 F | WEIGHT: 217.37 LBS | HEART RATE: 66 BPM | DIASTOLIC BLOOD PRESSURE: 74 MMHG | RESPIRATION RATE: 16 BRPM | SYSTOLIC BLOOD PRESSURE: 116 MMHG | OXYGEN SATURATION: 95 % | BODY MASS INDEX: 27.03 KG/M2 | HEIGHT: 75 IN

## 2017-11-30 DIAGNOSIS — M54.41 ACUTE RIGHT-SIDED LOW BACK PAIN WITH RIGHT-SIDED SCIATICA: ICD-10-CM

## 2017-11-30 PROCEDURE — 99213 OFFICE O/P EST LOW 20 MIN: CPT | Performed by: PHYSICIAN ASSISTANT

## 2017-11-30 NOTE — PROGRESS NOTES
"Subjective:   Miguel Bowen is a 27 y.o. male here today for follow-up on acute right-sided low back pain with right-sided sciatica.    Acute right-sided low back pain with right-sided sciatica  This is a 27-year-old male who presents today to follow-up on his herniated disc. He has another form to complete for his [employer]s with [employer]. He was unable to perform the walking test. States if he runs or walks he develops a heaviness of the right thigh. It causes him discomfort and pain. Denies any symptoms otherwise. He saw neurosurgery and Dr. Whitney who advised surgery. He is going to obtain a second opinion from another neurosurgeon. He wishes not to have any surgery performed at this time. He'll be following up with physical therapy in short order. He is able to ride a bike and swim without any discomfort.       Current medicines (including changes today)  No current outpatient prescriptions on file.     No current facility-administered medications for this visit.      He  has no past medical history on file.    ROS   No chest pain, no shortness of breath, no abdominal pain and all other systems were reviewed and are negative.       Objective:     Blood pressure 116/74, pulse 66, temperature 36.4 °C (97.5 °F), resp. rate 16, height 1.899 m (6' 2.75\"), weight 98.6 kg (217 lb 6 oz), SpO2 95 %. Body mass index is 27.35 kg/m².   Physical Exam:  Constitutional: Alert, no distress.  Skin: Warm, dry, good turgor, no rashes in visible areas.  Eye: Equal, round and reactive, conjunctiva clear, lids normal.  ENMT: Lips without lesions, good dentition, oropharynx clear.  Neck: Trachea midline, no masses.   Lymph: No cervical or supraclavicular lymphadenopathy  Respiratory: Unlabored respiratory effort, lungs appear clear, no wheezes, no ronchi.  Cardiovascular: Normal S1, S2, no murmur, no edema.  Musculoskeletal: No change in gait.  Psych: Alert and oriented x3, normal affect and mood.        Assessment and Plan: "   The following treatment plan was discussed    1. Acute right-sided low back pain with right-sided sciatica  Chronic condition. Follow-up with physical therapy. Form was completed and returned. Authorized no physical activities with regard until end of May 2018. Follow-up for a second opinion with neurosurgery. Advised I would like to obtain those records as well. Contact me or return with any worsening symptoms.      Followup: Return if symptoms worsen or fail to improve.    Please note that this dictation was created using voice recognition software. I have made every reasonable attempt to correct obvious errors, but I expect that there are errors of grammar and possibly content that I did not discover before finalizing the note.

## 2017-11-30 NOTE — ASSESSMENT & PLAN NOTE
This is a 27-year-old male who presents today to follow-up on his herniated disc. He has another form to complete for his PTs with [employer]. He was unable to perform the walking test. States if he runs or walks he develops a heaviness of the right thigh. It causes him discomfort and pain. Denies any symptoms otherwise. He saw neurosurgery and Dr. Whitney who advised surgery. He is going to obtain a second opinion from another neurosurgeon. He wishes not to have any surgery performed at this time. He'll be following up with physical therapy in short order. He is able to ride a bike and swim without any discomfort.

## 2017-12-31 ENCOUNTER — OFFICE VISIT (OUTPATIENT)
Dept: URGENT CARE | Facility: CLINIC | Age: 27
End: 2017-12-31
Payer: COMMERCIAL

## 2017-12-31 VITALS
TEMPERATURE: 97.6 F | OXYGEN SATURATION: 99 % | HEART RATE: 68 BPM | DIASTOLIC BLOOD PRESSURE: 78 MMHG | HEIGHT: 74 IN | SYSTOLIC BLOOD PRESSURE: 134 MMHG | RESPIRATION RATE: 14 BRPM | WEIGHT: 221 LBS | BODY MASS INDEX: 28.36 KG/M2

## 2017-12-31 DIAGNOSIS — J02.9 PHARYNGITIS, UNSPECIFIED ETIOLOGY: ICD-10-CM

## 2017-12-31 LAB
INT CON NEG: NORMAL
INT CON POS: NORMAL
S PYO AG THROAT QL: NEGATIVE

## 2017-12-31 PROCEDURE — 99214 OFFICE O/P EST MOD 30 MIN: CPT | Performed by: PHYSICIAN ASSISTANT

## 2017-12-31 PROCEDURE — 87880 STREP A ASSAY W/OPTIC: CPT | Performed by: PHYSICIAN ASSISTANT

## 2017-12-31 ASSESSMENT — ENCOUNTER SYMPTOMS
DIARRHEA: 0
STRIDOR: 0
HEADACHES: 0
ABDOMINAL PAIN: 0
HOARSE VOICE: 0
COUGH: 0
NECK PAIN: 0
SHORTNESS OF BREATH: 0

## 2017-12-31 NOTE — PROGRESS NOTES
"Subjective:      Miguel Bowen is a 27 y.o. male who presents with Other (throat burning and painful swallowing)            Pharyngitis    This is a new problem. The current episode started yesterday. The problem has been unchanged. Neither side of throat is experiencing more pain than the other. There has been no fever. The fever has been present for less than 1 day. The pain is at a severity of 3/10. The pain is mild. Pertinent negatives include no abdominal pain, congestion, coughing, diarrhea, ear pain, headaches, hoarse voice, neck pain, shortness of breath or stridor. He has tried nothing for the symptoms. The treatment provided no relief.       Review of Systems   HENT: Negative for congestion, ear pain and hoarse voice.    Respiratory: Negative for cough, shortness of breath and stridor.    Gastrointestinal: Negative for abdominal pain and diarrhea.   Musculoskeletal: Negative for neck pain.   Neurological: Negative for headaches.          Objective:     /78   Pulse 68   Temp 36.4 °C (97.6 °F)   Resp 14   Ht 1.88 m (6' 2\")   Wt 100.2 kg (221 lb)   SpO2 99%   BMI 28.37 kg/m²      Physical Exam       Gen.: Patient is A and O ×3, no acute distress, well-nourished well-hydrated  Vitals: Are listed and unremarkable  HEENT: Heads normocephalic, atraumatic, PERRLA, extraocular movements intact, TMs and oropharynx clear  Neck: Soft supple without cervical lymphadenopathy  Cardiovascular: Regular rate and rhythm normal S1 and S2. No murmurs, rubs or gallops  Lungs are clear to auscultation bilaterally. no wheezes rales or rhonchi  Abdomen is soft, nontender, nondistended with good bowel sounds, no hepatosplenomegaly  Skin: Is well perfused without evidence of rash or lesions  Neurological:  cranial nerves II through XII were assessed and intact.  Musculoskeletal: Full range of motion,  Neurovascularly Intact with a 2 second cap refill, good distal pulses    Urgent care course: Rapid strep was done and " was negative       Assessment/Plan:     1. Pharyngitis, unspecified etiology  Discussed likely viral etiology length. Rapid strep was negative. Supportive care measures. Follow up as needed. I recommended for him to take ibuprofen 600 mg 3 times a day as needed for the next few days to decrease inflammation as needed  - POCT Rapid Strep A

## 2018-01-04 ENCOUNTER — OFFICE VISIT (OUTPATIENT)
Dept: MEDICAL GROUP | Facility: MEDICAL CENTER | Age: 28
End: 2018-01-04
Payer: COMMERCIAL

## 2018-01-04 VITALS
DIASTOLIC BLOOD PRESSURE: 78 MMHG | TEMPERATURE: 97.8 F | HEART RATE: 66 BPM | BODY MASS INDEX: 28.08 KG/M2 | WEIGHT: 218.8 LBS | OXYGEN SATURATION: 98 % | SYSTOLIC BLOOD PRESSURE: 128 MMHG | RESPIRATION RATE: 16 BRPM | HEIGHT: 74 IN

## 2018-01-04 DIAGNOSIS — M51.26 LUMBAR DISC HERNIATION: ICD-10-CM

## 2018-01-04 DIAGNOSIS — M54.41 CHRONIC RIGHT-SIDED LOW BACK PAIN WITH RIGHT-SIDED SCIATICA: ICD-10-CM

## 2018-01-04 DIAGNOSIS — G89.29 CHRONIC RIGHT-SIDED LOW BACK PAIN WITH RIGHT-SIDED SCIATICA: ICD-10-CM

## 2018-01-04 PROCEDURE — 99213 OFFICE O/P EST LOW 20 MIN: CPT | Performed by: PHYSICIAN ASSISTANT

## 2018-01-04 NOTE — ASSESSMENT & PLAN NOTE
This is a 27-year-old male who is here today to obtain another referral to seek an opinion about his chronic low back disc herniation that causes some pain in his right leg and foot when he is active. MRI showed significant findings for a disc herniation in the L4-L5. He is already been seen by 2 neurologists. Both neurosurgeons have recommended surgery right from the start of the appointment. Neither has talked about how he is doing. He states again that his day-to-day routine is not affected by the herniations. 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 He can walk. Certain things like running may cause issues with his right lower extremity. Also doing sit ups may cause exacerbation. But overall he has no pain in his lumbar spine. He is still waiting be contacted for physical therapy at [facility]. He is requesting a second opinion by Dr. Davila. [employer] is requesting information on how long and what prognosis and possible therapy options he has available.

## 2018-01-04 NOTE — PROGRESS NOTES
"Subjective:   Miguel Bowen is a 27 y.o. male here today for referral to neurosurgery to see Dr. chau.    Chronic right-sided low back pain with right-sided sciatica  This is a 27-year-old male who is here today to obtain another referral to seek an opinion about his chronic low back disc herniation that causes some pain in his right leg and foot when he is active. MRI showed significant findings for a disc herniation in the L4-L5. He is already been seen by 2 neurologists. Both neurosurgeons have recommended surgery right from the start of the appointment. Neither has talked about how he is doing. He states again that his day-to-day routine is not affected by the herniations. 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 He can walk. Certain things like running may cause issues with his right lower extremity. Also doing sit ups may cause exacerbation. But overall he has no pain in his lumbar spine. He is still waiting be contacted for physical therapy at [facility]. He is requesting a second opinion by Dr. Chau. [employer] is requesting information on how long and what prognosis and possible therapy options he has available.       Current medicines (including changes today)  No current outpatient prescriptions on file.     No current facility-administered medications for this visit.      He  has no past medical history on file.    ROS   No chest pain, no shortness of breath, no abdominal pain and all other systems were reviewed and are negative.    Past medical history, social history and family history were updated and reviewed.       Objective:     Blood pressure 128/78, pulse 66, temperature 36.6 °C (97.8 °F), resp. rate 16, height 1.88 m (6' 2\"), weight 99.2 kg (218 lb 12.8 oz), SpO2 98 %. Body mass index is 28.09 kg/m².   Physical Exam:  Constitutional: Alert, no distress.  Skin: Warm, dry, good turgor, no rashes in visible areas.  Eye: Equal, round and reactive, conjunctiva clear, lids " normal.  ENMT: Lips without lesions, good dentition, oropharynx clear.  Neck: Trachea midline, no masses.   Lymph: No cervical or supraclavicular lymphadenopathy  Respiratory: Unlabored respiratory effort, lungs appear clear, no wheezes.  Cardiovascular: Normal S1, S2, no murmur, no edema.  Psych: Alert and oriented x3, normal affect and mood.        Assessment and Plan:   The following treatment plan was discussed    1. Chronic right-sided low back pain with right-sided sciatica  Referred to Dr. Davila. No current pain of his lumbar spine. Also referred to physical therapy and [employer] [facility] for further evaluation. Expect Dr. Davila to conclude findings and prognosis for his employer.  - REFERRAL TO NEUROSURGERY    2. Lumbar disc herniation  Chronic condition. Symptomatic only with significant activities. Referred to PT and neurosurgery for further evaluation.  - REFERRAL TO PHYSICAL THERAPY Reason for Therapy: Eval/Treat/Report  - REFERRAL TO NEUROSURGERY      Followup: No Follow-up on file.    Please note that this dictation was created using voice recognition software. I have made every reasonable attempt to correct obvious errors, but I expect that there are errors of grammar and possibly content that I did not discover before finalizing the note.

## 2018-01-14 ENCOUNTER — HOSPITAL ENCOUNTER (OUTPATIENT)
Dept: RADIOLOGY | Facility: MEDICAL CENTER | Age: 28
End: 2018-01-14
Attending: NEUROLOGICAL SURGERY
Payer: COMMERCIAL

## 2018-01-14 DIAGNOSIS — M54.5 LOW BACK PAIN, UNSPECIFIED BACK PAIN LATERALITY, UNSPECIFIED CHRONICITY, WITH SCIATICA PRESENCE UNSPECIFIED: ICD-10-CM

## 2018-01-14 PROCEDURE — 72110 X-RAY EXAM L-2 SPINE 4/>VWS: CPT

## 2018-02-08 ENCOUNTER — OFFICE VISIT (OUTPATIENT)
Dept: MEDICAL GROUP | Facility: MEDICAL CENTER | Age: 28
End: 2018-02-08
Payer: COMMERCIAL

## 2018-02-08 VITALS
HEIGHT: 74 IN | WEIGHT: 221.6 LBS | BODY MASS INDEX: 28.44 KG/M2 | OXYGEN SATURATION: 97 % | TEMPERATURE: 97.7 F | DIASTOLIC BLOOD PRESSURE: 70 MMHG | RESPIRATION RATE: 14 BRPM | SYSTOLIC BLOOD PRESSURE: 118 MMHG | HEART RATE: 64 BPM

## 2018-02-08 DIAGNOSIS — M54.41 CHRONIC RIGHT-SIDED LOW BACK PAIN WITH RIGHT-SIDED SCIATICA: ICD-10-CM

## 2018-02-08 DIAGNOSIS — G89.29 CHRONIC RIGHT-SIDED LOW BACK PAIN WITH RIGHT-SIDED SCIATICA: ICD-10-CM

## 2018-02-08 PROCEDURE — 99213 OFFICE O/P EST LOW 20 MIN: CPT | Performed by: PHYSICIAN ASSISTANT

## 2018-02-08 NOTE — ASSESSMENT & PLAN NOTE
This is a 27-year-old male who is here today to have another form completed for the . Form is the same that I completed few months ago. There is no change in his exercise tolerance. Still unable to run. Has been trying to increase at least jogging but still develops numbness and difficulty running with his right leg. He did see Dr. chau. Dr. chau also advised surgery. This is his third recommendation for surgery. He will likely proceed. He states Dr. chau would like to see him in 3 months. He is continuing to see physical therapy. He believes it is helping. At least he has flexibility in the leg. He states that sit ups are difficult still the do. 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 He is trying to become a full-time  with [employer]. Still working for the [employer] as an .

## 2018-02-08 NOTE — PROGRESS NOTES
"Subjective:   Miguel Bowen is a 27 y.o. male here today for chronic low back pain with right sciatica.    Chronic right-sided low back pain with right-sided sciatica  This is a 27-year-old male who is here today to have another form completed for the . Form is the same that I completed few months ago. There is no change in his exercise tolerance. Still unable to run. Has been trying to increase at least jogging but still develops numbness and difficulty running with his right leg. He did see Dr. chau. Dr. chau also advised surgery. This is his third recommendation for surgery. He will likely proceed. He states Dr. chau would like to see him in 3 months. He is continuing to see physical therapy. He believes it is helping. At least he has flexibility in the leg. He states that sit ups are difficult still the do. 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 He is trying to become a full-time  with [employer]. Still working for the [employer] service as an .       Current medicines (including changes today)  No current outpatient prescriptions on file.     No current facility-administered medications for this visit.      He  has no past medical history on file.    Social History and Family History were reviewed and updated.    ROS   No chest pain, no shortness of breath, no abdominal pain and all other systems were reviewed and are negative.       Objective:     Blood pressure 118/70, pulse 64, temperature 36.5 °C (97.7 °F), resp. rate 14, height 1.88 m (6' 2\"), weight 100.5 kg (221 lb 9.6 oz), SpO2 97 %. Body mass index is 28.45 kg/m².   Physical Exam:  Constitutional: Alert, no distress.  Skin: Warm, dry, good turgor, no rashes in visible areas.  Eye: Equal, round and reactive, conjunctiva clear, lids normal.  ENMT: Lips without lesions, good dentition, oropharynx clear.  Neck: Trachea midline, no masses.   Lymph: No cervical or supraclavicular lymphadenopathy  Respiratory: Unlabored " respiratory effort, lungs appear clear, no wheezes.  Cardiovascular: Normal S1, S2, no murmur, no edema.  Musculoskeletal: No change in gait.  Psych: Alert and oriented x3, normal affect and mood.        Assessment and Plan:   The following treatment plan was discussed    1. Chronic right-sided low back pain with right-sided sciatica  Chronic condition. Still with right leg numbness. Follow-up with neurosurgery. Follow up with physical therapy. Form was completed and returned.    Total 20 minutes face-to-face time spent with patient, with greater than 50% of the total time discussing patient's issues and symptoms as listed above in assessment and plan, as well as managing coordination of care for future evaluation and treatment.      Followup: Return if symptoms worsen or fail to improve.    Please note that this dictation was created using voice recognition software. I have made every reasonable attempt to correct obvious errors, but I expect that there are errors of grammar and possibly content that I did not discover before finalizing the note.

## 2018-06-14 ENCOUNTER — APPOINTMENT (OUTPATIENT)
Dept: MEDICAL GROUP | Facility: MEDICAL CENTER | Age: 28
End: 2018-06-14
Payer: COMMERCIAL

## 2018-08-03 ENCOUNTER — OFFICE VISIT (OUTPATIENT)
Dept: MEDICAL GROUP | Facility: MEDICAL CENTER | Age: 28
End: 2018-08-03
Payer: COMMERCIAL

## 2018-08-03 VITALS
SYSTOLIC BLOOD PRESSURE: 104 MMHG | BODY MASS INDEX: 28.49 KG/M2 | HEIGHT: 74 IN | TEMPERATURE: 97.2 F | WEIGHT: 222 LBS | HEART RATE: 55 BPM | DIASTOLIC BLOOD PRESSURE: 62 MMHG | OXYGEN SATURATION: 100 %

## 2018-08-03 DIAGNOSIS — M54.41 CHRONIC RIGHT-SIDED LOW BACK PAIN WITH RIGHT-SIDED SCIATICA: ICD-10-CM

## 2018-08-03 DIAGNOSIS — M51.26 LUMBAR DISC HERNIATION: ICD-10-CM

## 2018-08-03 DIAGNOSIS — G89.29 CHRONIC RIGHT-SIDED LOW BACK PAIN WITH RIGHT-SIDED SCIATICA: ICD-10-CM

## 2018-08-03 PROCEDURE — 99213 OFFICE O/P EST LOW 20 MIN: CPT | Performed by: PHYSICIAN ASSISTANT

## 2018-08-03 RX ORDER — IBUPROFEN 200 MG
200 TABLET ORAL EVERY 6 HOURS PRN
COMMUNITY
End: 2019-12-09

## 2018-08-03 NOTE — ASSESSMENT & PLAN NOTE
This is a 27-year-old male accompanied by his daughter. He is still with some numbness of his right lower leg. States that the symptoms get worse with running for longer periods. Denies any pain currently. He has significant disc herniations which are the cause and it is been advised by neurosurgeons to have surgery. He is hesitant because the pain improved with exercise, stretching and weight loss. He states that he would like to become fully employed at [employer] as a  before he considers surgery. That may be months away. He is not taking any medications regularly for his symptoms but does take ibuprofen when needed. He currently is employed at [employer]. Also is in [employer] but needs a waiver completed and filled out in order not to run or do sit ups or pushups which do exacerbate his symptoms.

## 2018-08-03 NOTE — PROGRESS NOTES
"Subjective:   Miguel Bowen is a 27 y.o. male here today for continued numbness of his right lower leg secondary to a disc herniation.    Chronic right-sided low back pain with right-sided sciatica  This is a 27-year-old male accompanied by his daughter. He is still with some numbness of his right lower leg. States that the symptoms get worse with running for longer periods. Denies any pain currently. He has significant disc herniations which are the cause and it is been advised by neurosurgeons to have surgery. He is hesitant because the pain improved with exercise, stretching and weight loss. He states that he would like to become fully employed at [employer] as a  before he considers surgery. That may be months away. He is not taking any medications regularly for his symptoms but does take ibuprofen when needed. He currently is employed at [employer]. Also is in [employer] but needs a waiver completed and filled out in order not to run or do sit ups or pushups which do exacerbate his symptoms.       Current medicines (including changes today)  Current Outpatient Prescriptions   Medication Sig Dispense Refill   • ibuprofen (MOTRIN) 200 MG Tab Take 200 mg by mouth every 6 hours as needed.       No current facility-administered medications for this visit.      He  has no past medical history on file.    Social History and Family History were reviewed and updated.    ROS   No chest pain, no shortness of breath, no abdominal pain and all other systems were reviewed and are negative.       Objective:     Blood pressure 104/62, pulse (!) 55, temperature 36.2 °C (97.2 °F), height 1.88 m (6' 2\"), weight 100.7 kg (222 lb 0.1 oz), SpO2 100 %. Body mass index is 28.5 kg/m².   Physical Exam:  Constitutional: Alert, no distress.  Skin: Warm, dry, good turgor, no rashes in visible areas.  Eye: Equal, round and reactive, conjunctiva clear, lids normal.  ENMT: Lips without lesions, good dentition, oropharynx " clear.  Neck: Trachea midline, no masses.   Lymph: No cervical or supraclavicular lymphadenopathy  Respiratory: Unlabored respiratory effort, lungs appear clear, no wheezes.  Cardiovascular: Normal S1, S2, no murmur, no edema.  Musculoskeletal: No change in gait.  Psych: Alert and oriented x3, normal affect and mood.        Assessment and Plan:   The following treatment plan was discussed    1. Chronic right-sided low back pain with right-sided sciatica  Chronic condition with only concern being numbness of the right lower leg. Symptoms are exacerbated with certain stressors. Referred to Dr. Houston for another evaluation. Signed form for his excuse from PT from [employer].  - REFERRAL TO ORTHOPEDICS    Patient was seen for 15 minutes face to face of which > 50% of appointment time was spent on counseling and coordination of care regarding the above.        Followup: Return in about 3 months (around 11/3/2018), or if symptoms worsen or fail to improve.    Please note that this dictation was created using voice recognition software. I have made every reasonable attempt to correct obvious errors, but I expect that there are errors of grammar and possibly content that I did not discover before finalizing the note.

## 2018-10-15 ENCOUNTER — OFFICE VISIT (OUTPATIENT)
Dept: MEDICAL GROUP | Facility: MEDICAL CENTER | Age: 28
End: 2018-10-15
Payer: COMMERCIAL

## 2018-10-15 VITALS
TEMPERATURE: 97.6 F | SYSTOLIC BLOOD PRESSURE: 142 MMHG | HEIGHT: 74 IN | DIASTOLIC BLOOD PRESSURE: 65 MMHG | HEART RATE: 58 BPM | BODY MASS INDEX: 29.39 KG/M2 | WEIGHT: 229 LBS | OXYGEN SATURATION: 96 %

## 2018-10-15 DIAGNOSIS — G89.29 CHRONIC RIGHT-SIDED LOW BACK PAIN WITH RIGHT-SIDED SCIATICA: ICD-10-CM

## 2018-10-15 DIAGNOSIS — M54.41 CHRONIC RIGHT-SIDED LOW BACK PAIN WITH RIGHT-SIDED SCIATICA: ICD-10-CM

## 2018-10-15 DIAGNOSIS — R21 RASH: ICD-10-CM

## 2018-10-15 DIAGNOSIS — M51.26 LUMBAR DISC HERNIATION: ICD-10-CM

## 2018-10-15 PROCEDURE — 99214 OFFICE O/P EST MOD 30 MIN: CPT | Performed by: PHYSICIAN ASSISTANT

## 2018-10-15 RX ORDER — TRIAMCINOLONE ACETONIDE 1 MG/G
1 CREAM TOPICAL 3 TIMES DAILY
Qty: 80 G | Refills: 1 | Status: SHIPPED | OUTPATIENT
Start: 2018-10-15 | End: 2019-12-09

## 2018-10-15 NOTE — ASSESSMENT & PLAN NOTE
Also now complains of a new onset of a rash of his left leg. Itching behind the leg. Denies any sick contacts. Is trying over-the-counter hydrocortisone cream. No improvement.

## 2018-10-15 NOTE — ASSESSMENT & PLAN NOTE
This is a 28-year-old male who comes in today with continued neuropathy in the right leg secondary to a disc protrusion of L4-L5. Recently he went through PT with [employer]. He did a 2 km walk which afterward developed significant symptoms with delayed. He still is unable to do any pushups or sit ups. Also is unable to lift anything such as huge concrete blocks without assistance. He has an appointment in the near future with his neurosurgeon. [employer] is requesting a letter from his PCP regarding a diagnosis, treatment options and a prognosis. At this time he is not interested in surgery given the unknowns. He states that the only time he is symptomatic as when he is doing the PT regimen required by regard. He continues to see chiropractor. Physical therapy was not effective.

## 2018-10-15 NOTE — LETTER
October 15, 2018         Patient: Miguel Bowen   YOB: 1990   Date of Visit: 10/15/2018           To Whom it May Concern:    Miguel Bowen was seen in my clinic on 10/15/2018. He has a chronic history of right leg pain and numbness from a disc herniation of L4 - L5.  He is followed by Dr. Davila, a neurosurgeon.  He has had physical therapy and is in care of a chiropractor.  His symptoms persist.  I expect Mr. Bowen to follow-up with Dr. Davila who would know best the prognosis of his condition.     If you have any questions or concerns, please don't hesitate to call. Thank you.        Sincerely,           Eron Saenz P.A.-C.  Electronically Signed

## 2018-10-15 NOTE — PROGRESS NOTES
Subjective:   Miguel Bowen is a 28 y.o. male here today for chronic right leg pain and numbness exacerbated by PT from disc herniation and a rash on his left leg for a couple weeks.    Chronic right-sided low back pain with right-sided sciatica  This is a 28-year-old male who comes in today with continued neuropathy in the right leg secondary to a disc protrusion of L4-L5. Recently he went through PT with [employer]. He did a 2 km walk which afterward developed significant symptoms with delayed. He still is unable to do any pushups or sit ups. Also is unable to lift anything such as huge concrete blocks without assistance. He has an appointment in the near future with his neurosurgeon. [employer] is requesting a letter from his PCP regarding a diagnosis, treatment options and a prognosis. At this time he is not interested in surgery given the unknowns. He states that the only time he is symptomatic as when he is doing the PT regimen required by [employer]. He continues to see chiropractor. Physical therapy was not effective.    Rash  Also now complains of a new onset of a rash of his left leg. Itching behind the leg. Denies any sick contacts. Is trying over-the-counter hydrocortisone cream. No improvement.       Current medicines (including changes today)  Current Outpatient Prescriptions   Medication Sig Dispense Refill   • triamcinolone acetonide (KENALOG) 0.1 % Cream Apply 1 Application to affected area(s) 3 times a day. 80 g 1   • ibuprofen (MOTRIN) 200 MG Tab Take 200 mg by mouth every 6 hours as needed.       No current facility-administered medications for this visit.      He  has no past medical history on file.    Social History and Family History were reviewed and updated.    ROS   No chest pain, no shortness of breath, no abdominal pain and all other systems were reviewed and are negative.       Objective:     Blood pressure 142/65, pulse (!) 58, temperature 36.4 °C (97.6 °F), height 1.88  "m (6' 2\"), weight 103.9 kg (229 lb), SpO2 96 %. Body mass index is 29.4 kg/m².   Physical Exam:  Constitutional: Alert, no distress.  Skin: Warm, dry, good turgor. Dryness and raised excoriations and back the leg. No significant vesicles noted. No significant lesions.  Eye: Equal, round and reactive, conjunctiva clear, lids normal.  ENMT: Lips without lesions, good dentition, oropharynx clear.  Neck: Trachea midline, no masses.   Lymph: No cervical or supraclavicular lymphadenopathy  Respiratory: Unlabored respiratory effort, lungs appear clear, no wheezes.  Cardiovascular: Normal S1, S2, no murmur, no edema.  Musculoskeletal: No change in gait.  Psych: Alert and oriented x3, normal affect and mood.        Assessment and Plan:   The following treatment plan was discussed    1. Rash  Acute, new onset condition. Appears to be dry skin. Provided a stronger steroid cream. Also use over-the-counter cream such as endocrine.  - triamcinolone acetonide (KENALOG) 0.1 % Cream; Apply 1 Application to affected area(s) 3 times a day.  Dispense: 80 g; Refill: 1    2. Chronic right-sided low back pain with right-sided sciatica  Chronic condition. Stable. Provided a letter. Advised he will need to follow-up with his neurosurgeon. Discussed treatment options such as surgery. Currently he is on the fence about surgery. I agreed.    Patient was seen for 25 minutes face to face of which > 50% of appointment time was spent on counseling and coordination of care regarding the above.        Followup: Return in about 3 months (around 1/15/2019).    Please note that this dictation was created using voice recognition software. I have made every reasonable attempt to correct obvious errors, but I expect that there are errors of grammar and possibly content that I did not discover before finalizing the note.           "

## 2018-11-03 ENCOUNTER — HOSPITAL ENCOUNTER (EMERGENCY)
Facility: MEDICAL CENTER | Age: 28
End: 2018-11-03
Attending: EMERGENCY MEDICINE
Payer: COMMERCIAL

## 2018-11-03 VITALS
HEIGHT: 74 IN | DIASTOLIC BLOOD PRESSURE: 73 MMHG | SYSTOLIC BLOOD PRESSURE: 109 MMHG | OXYGEN SATURATION: 99 % | RESPIRATION RATE: 15 BRPM | BODY MASS INDEX: 28.23 KG/M2 | TEMPERATURE: 97.7 F | WEIGHT: 220 LBS | HEART RATE: 68 BPM

## 2018-11-03 DIAGNOSIS — M54.40 CHRONIC LOW BACK PAIN WITH SCIATICA, SCIATICA LATERALITY UNSPECIFIED, UNSPECIFIED BACK PAIN LATERALITY: ICD-10-CM

## 2018-11-03 DIAGNOSIS — F43.21 SITUATIONAL DEPRESSION: ICD-10-CM

## 2018-11-03 DIAGNOSIS — Z63.8 FAMILY DISCORD: ICD-10-CM

## 2018-11-03 DIAGNOSIS — G89.29 CHRONIC LOW BACK PAIN WITH SCIATICA, SCIATICA LATERALITY UNSPECIFIED, UNSPECIFIED BACK PAIN LATERALITY: ICD-10-CM

## 2018-11-03 LAB
AMPHET UR QL SCN: NEGATIVE
BARBITURATES UR QL SCN: NEGATIVE
BENZODIAZ UR QL SCN: NEGATIVE
BZE UR QL SCN: NEGATIVE
CANNABINOIDS UR QL SCN: NEGATIVE
METHADONE UR QL SCN: NEGATIVE
OPIATES UR QL SCN: NEGATIVE
OXYCODONE UR QL SCN: NEGATIVE
PCP UR QL SCN: NEGATIVE
POC BREATHALIZER: 0 PERCENT (ref 0–0.01)
PROPOXYPH UR QL SCN: NEGATIVE

## 2018-11-03 PROCEDURE — 99284 EMERGENCY DEPT VISIT MOD MDM: CPT

## 2018-11-03 PROCEDURE — 90791 PSYCH DIAGNOSTIC EVALUATION: CPT

## 2018-11-03 PROCEDURE — 80307 DRUG TEST PRSMV CHEM ANLYZR: CPT

## 2018-11-03 PROCEDURE — 700102 HCHG RX REV CODE 250 W/ 637 OVERRIDE(OP): Performed by: EMERGENCY MEDICINE

## 2018-11-03 PROCEDURE — A9270 NON-COVERED ITEM OR SERVICE: HCPCS | Performed by: EMERGENCY MEDICINE

## 2018-11-03 PROCEDURE — 302970 POC BREATHALIZER: Performed by: EMERGENCY MEDICINE

## 2018-11-03 RX ORDER — IBUPROFEN 600 MG/1
600 TABLET ORAL ONCE
Status: COMPLETED | OUTPATIENT
Start: 2018-11-03 | End: 2018-11-03

## 2018-11-03 RX ADMIN — IBUPROFEN 600 MG: 600 TABLET, FILM COATED ORAL at 07:45

## 2018-11-03 SDOH — SOCIAL STABILITY - SOCIAL INSECURITY: OTHER SPECIFIED PROBLEMS RELATED TO PRIMARY SUPPORT GROUP: Z63.8

## 2018-11-03 ASSESSMENT — LIFESTYLE VARIABLES
CONSUMPTION TOTAL: INCOMPLETE
DO YOU DRINK ALCOHOL: YES
EVER HAD A DRINK FIRST THING IN THE MORNING TO STEADY YOUR NERVES TO GET RID OF A HANGOVER: NO
EVER FELT BAD OR GUILTY ABOUT YOUR DRINKING: NO
TOTAL SCORE: 0
HAVE YOU EVER FELT YOU SHOULD CUT DOWN ON YOUR DRINKING: NO
HAVE PEOPLE ANNOYED YOU BY CRITICIZING YOUR DRINKING: NO

## 2018-11-03 ASSESSMENT — PAIN SCALES - GENERAL
PAINLEVEL_OUTOF10: 0
PAINLEVEL_OUTOF10: 0
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
## 2018-11-03 NOTE — ED NOTES
Pt evaluated by behavioral health  Pt given his cell phone to contact his [employer] point of contact

## 2018-11-03 NOTE — ED NOTES
Patient brought to the ER by Richi ZAPIEN ambulatory to er bed 21, pt was placed on a legal 2000 by Police, they were called to patient's home by his wife who reported he was expressing suicidal ideations because he was depressed about a back injury that is forcing him to separate from duties with the [employer]  Pt is alert, oriented x4, cooperative with plan of care, hand cuffs were removed by police, pt changed into hospital gown, he provided a clear yellow urine specimen  Security was called to inventory his belongings, the room was stripped of all medical equipment   breathalizer resulted 0.003  Pt is waiting evaluation by erp

## 2018-11-03 NOTE — ED NOTES
"Patient reporting frustration about the situation.  Denies any suicidal or homicidal ideations, stating he would never hurt his wife or kids.  Reports to being \"blindsided\" by the situation and was told by police that he should be able to get out later today.    "
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
## 2018-11-03 NOTE — ED NOTES
Patient provided his phone to find wife's phone number for [facility].  Wife (Crystal) [phone].  Patient allowed to text his boss at this time.  Phone placed back with belongings

## 2018-11-03 NOTE — ED NOTES
Patient updated on plan of care.  Patient calm and cooperative at this time.  Patient resting in bed.  Will continue to monitor.

## 2018-11-03 NOTE — ED NOTES
Allowed patient to text his children to give them the number to give to his wife so she can talk to [facility].  Attempted to call the children to have them wake their mother.  No answer at the time.  Patient allowed to text his boss to inform him that he would not be able to come into work tonight.  RN at bedside and patient showed this RN the text prior to it being sent.
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
## 2018-11-04 NOTE — DISCHARGE PLANNING
Medical Social Work    LSW received call from [facility] CPS LSW requesting information on why pt was discharged this morning. This LSW unaware that CPS had been previously contacted by hospital staff, but was updated by alert team RN in the morning that hold was being discontinued and that pt was discharging.      LSW provided alert team note to CPS worker justifying discontinuation as it is needed as part of ongoing CPS investigation.

## 2018-11-08 NOTE — DISCHARGE PLANNING
Lynette contacted by [facility] Jocelin asking questions regarding Pt due to referral made to them regarding Pt children and their safety.     LYNETTE spoke with Supervisor Kimber who gave permission to enter chart .    LYNETTE saw note from LYNETTE Smith confirming she sent information to [facility] on 11-.    Lynette contacted Jocelin with [facility] and verified with her that there is no new information in the chart and the assessment the received is the most current from our ALERT team but if they need records for investigation they could obtain through  Our Medical Records Department.

## 2019-05-14 ENCOUNTER — APPOINTMENT (OUTPATIENT)
Dept: RADIOLOGY | Facility: MEDICAL CENTER | Age: 29
End: 2019-05-14
Attending: NEUROLOGICAL SURGERY
Payer: COMMERCIAL

## 2019-05-14 DIAGNOSIS — M54.5 LOW BACK PAIN, UNSPECIFIED BACK PAIN LATERALITY, UNSPECIFIED CHRONICITY, WITH SCIATICA PRESENCE UNSPECIFIED: ICD-10-CM

## 2019-05-14 PROCEDURE — 72148 MRI LUMBAR SPINE W/O DYE: CPT

## 2019-11-03 ENCOUNTER — APPOINTMENT (OUTPATIENT)
Dept: RADIOLOGY | Facility: MEDICAL CENTER | Age: 29
End: 2019-11-03
Attending: EMERGENCY MEDICINE
Payer: COMMERCIAL

## 2019-11-03 ENCOUNTER — HOSPITAL ENCOUNTER (EMERGENCY)
Facility: MEDICAL CENTER | Age: 29
End: 2019-11-03
Attending: EMERGENCY MEDICINE
Payer: COMMERCIAL

## 2019-11-03 VITALS
SYSTOLIC BLOOD PRESSURE: 121 MMHG | TEMPERATURE: 98 F | DIASTOLIC BLOOD PRESSURE: 88 MMHG | BODY MASS INDEX: 29.14 KG/M2 | HEART RATE: 79 BPM | WEIGHT: 227.07 LBS | OXYGEN SATURATION: 94 % | HEIGHT: 74 IN | RESPIRATION RATE: 16 BRPM

## 2019-11-03 DIAGNOSIS — M51.37 DEGENERATIVE DISC DISEASE AT L5-S1 LEVEL: ICD-10-CM

## 2019-11-03 DIAGNOSIS — S46.911A STRAIN OF RIGHT SHOULDER, INITIAL ENCOUNTER: ICD-10-CM

## 2019-11-03 PROCEDURE — 72100 X-RAY EXAM L-S SPINE 2/3 VWS: CPT

## 2019-11-03 PROCEDURE — 72220 X-RAY EXAM SACRUM TAILBONE: CPT

## 2019-11-03 PROCEDURE — 99284 EMERGENCY DEPT VISIT MOD MDM: CPT

## 2019-11-03 PROCEDURE — 73030 X-RAY EXAM OF SHOULDER: CPT | Mod: RT

## 2019-11-03 ASSESSMENT — ENCOUNTER SYMPTOMS
WEAKNESS: 0
LOSS OF CONSCIOUSNESS: 0
FALLS: 1
BACK PAIN: 1
FEVER: 0
NECK PAIN: 0
HEADACHES: 0
ABDOMINAL PAIN: 0
BLURRED VISION: 0
DOUBLE VISION: 0
VOMITING: 0
NAUSEA: 0

## 2019-11-04 NOTE — ED NOTES
Discharge instructions given. Educated on when to return to ed and follow up with PCP. Pt verbalized understanding.

## 2019-11-04 NOTE — ED TRIAGE NOTES
"Presents complaining of right shoulder, right leg, and back pain after a GLF earlier today.     Chief Complaint   Patient presents with   • Shoulder Injury   • Back Pain   • T-5000 FALL   • Leg Injury     /95   Pulse 96   Temp 36.7 °C (98.1 °F) (Temporal)   Resp 16   Ht 1.88 m (6' 2\")   Wt 103 kg (227 lb 1.2 oz)   SpO2 96%   BMI 29.15 kg/m²     "

## 2019-11-04 NOTE — ED PROVIDER NOTES
ED Provider Note    CHIEF COMPLAINT  Chief Complaint   Patient presents with   • Shoulder Injury   • Back Pain   • T-5000 FALL   • Leg Injury       HPI  Miguel Bowen is a 29 y.o. male with shoulder pain, back pain, and leg pain after a fall.  Patient reports that he was at work at a proximally 1 PM today when his fall occurred.  Patient was stocking a shelf, when he began to fall over, he stepped backwards tripping over a bucket and falling to the ground landing on his right side.  Patient reports that he immediately got up and put the shelf back in place.  15 minutes later he started to notice that he was having pain in the right shoulder, lower back, and right leg.  Patient denies any difficulty walking, and states that he left work, went home, and took ibuprofen.  The ibuprofen helps with his pain, but his work urged him to come in for further evaluation.  Patient denies any numbness or weakness of the legs.  Reports that the pain in his right shoulder is exacerbated by movement especially trying to put his arm overhead.    REVIEW OF SYSTEMS  Review of Systems   Constitutional: Negative for fever and malaise/fatigue.   Eyes: Negative for blurred vision and double vision.   Gastrointestinal: Negative for abdominal pain, nausea and vomiting.   Musculoskeletal: Positive for back pain, falls and joint pain. Negative for neck pain.   Neurological: Negative for loss of consciousness, weakness and headaches.   All other systems reviewed and are negative.      PAST MEDICAL HISTORY       SOCIAL HISTORY  Social History     Tobacco Use   • Smoking status: Never Smoker   • Smokeless tobacco: Never Used   Substance and Sexual Activity   • Alcohol use: Yes     Comment: Rarely   • Drug use: No   • Sexual activity: Yes     Partners: Female       SURGICAL HISTORY  patient denies any surgical history    CURRENT MEDICATIONS  Home Medications     Reviewed by Gurinder Hassan R.N. (Registered Nurse) on 11/03/19 at 0747  Med List  "Status: Partial   Medication Last Dose Status   ibuprofen (MOTRIN) 200 MG Tab 11/3/2019 Active   triamcinolone acetonide (KENALOG) 0.1 % Cream Not taking Active                ALLERGIES  No Known Allergies    PHYSICAL EXAM  VITAL SIGNS: /95   Pulse 96   Temp 36.7 °C (98.1 °F) (Temporal)   Resp 16   Ht 1.88 m (6' 2\")   Wt 103 kg (227 lb 1.2 oz)   SpO2 96%   BMI 29.15 kg/m²    Pulse ox interpretation: I interpret this pulse ox as normal.    Physical Exam   Constitutional: He is oriented to person, place, and time and well-developed, well-nourished, and in no distress.   HENT:   Head: Normocephalic and atraumatic.   Mouth/Throat: Oropharynx is clear and moist.   Eyes: Pupils are equal, round, and reactive to light. Conjunctivae and EOM are normal.   Neck: Normal range of motion. Neck supple.   Cardiovascular: Normal rate, regular rhythm and intact distal pulses.   Pulmonary/Chest: Effort normal and breath sounds normal. No respiratory distress. He has no rales. He exhibits no tenderness.   Abdominal: Soft. Bowel sounds are normal. He exhibits no distension. There is no tenderness.   Musculoskeletal:      Right shoulder: He exhibits decreased range of motion (unable to put arm overhead secondary to pain) and tenderness (anterior, AC joint and clavicle). He exhibits no swelling and no deformity.      Lumbar back: He exhibits tenderness. He exhibits no bony tenderness, no swelling and no edema.   Neurological: He is alert and oriented to person, place, and time. GCS score is 15.   Skin: Skin is warm and dry. He is not diaphoretic.   Psychiatric: Affect and judgment normal.   Nursing note and vitals reviewed.        DIAGNOSTIC STUDIES    DX-SACRUM AND COCCYX 2+   Final Result      Negative sacrum and coccyx exam.      DX-LUMBAR SPINE-2 OR 3 VIEWS   Final Result      Mild worsening L5/S1 moderate, L4/5 mild degenerative disc disease      Straightening of the normal lordosis      DX-SHOULDER 2+ RIGHT   Final " Result      Negative shoulder series.            COURSE & MEDICAL DECISION MAKING  Pertinent Labs & Imaging studies reviewed. (See chart for details)  Previously healthy 29-year-old male presents for pain after fall.  On my initial examination, he was well-appearing though notably mildly hypertensive.  Patient had pain with movement of the right shoulder and pain in his lower back.  He had no focal neurologic findings and no weakness on examination.  Patient is Brett taken ibuprofen at home, and stated that this had improved his pain.  Differential diagnosis includes but is not limited to rotator cuff injury, contusion, sprain, AC separation, clavicular fracture, humeral fracture, vertebral fracture, lumbar strain    X-rays were obtained of the shoulder and lower back.  They showed no acute traumatic injuries.  Patient did have degenerative disc disease present in the L5/S1 and L4/L5.  I discussed these findings with the patient including his degenerative disc disease.  I feel that he likely has a rotator cuff injury given the location of pain and pain with movement on overhead motion of the shoulder.  I recommended a sling, rest, ice, ibuprofen, and follow-up with orthopedic surgery.  Patient was understanding of these recommendations and was comfortable discharge home.    The patient will return for new or worsening symptoms and is stable at the time of discharge.    The patient is referred to a primary physician for blood pressure management, diabetic screening, and for all other preventative health concerns.      DISPOSITION:  Patient will be discharged home in stable condition.    FOLLOW UP:  Eron Saenz, P.A.-C.  [address]  [address]  [facility] [address]  [phone]          Tyree Stephens M.D.  [address]  [facility] [address]  [phone]      If symptoms worsen or fail to improve      OUTPATIENT MEDICATIONS:  New Prescriptions    No medications on file          FINAL IMPRESSION  Visit  Diagnoses     ICD-10-CM   1. Degenerative disc disease at L5-S1 level M51.36   2. Strain of right shoulder, initial encounter S46.911A              Electronically signed by: Cherry Acevedo, 11/3/2019 6:26 PM

## 2019-12-09 ENCOUNTER — HOSPITAL ENCOUNTER (EMERGENCY)
Facility: MEDICAL CENTER | Age: 29
End: 2019-12-09
Attending: EMERGENCY MEDICINE
Payer: COMMERCIAL

## 2019-12-09 VITALS
HEIGHT: 74 IN | BODY MASS INDEX: 28.58 KG/M2 | TEMPERATURE: 98 F | OXYGEN SATURATION: 97 % | SYSTOLIC BLOOD PRESSURE: 137 MMHG | RESPIRATION RATE: 16 BRPM | HEART RATE: 55 BPM | DIASTOLIC BLOOD PRESSURE: 71 MMHG | WEIGHT: 222.66 LBS

## 2019-12-09 DIAGNOSIS — M54.31 SCIATICA OF RIGHT SIDE: ICD-10-CM

## 2019-12-09 PROCEDURE — 99282 EMERGENCY DEPT VISIT SF MDM: CPT

## 2019-12-09 ASSESSMENT — PAIN SCALES - WONG BAKER: WONGBAKER_NUMERICALRESPONSE: DOESN'T HURT AT ALL

## 2019-12-09 ASSESSMENT — LIFESTYLE VARIABLES
HOW MANY TIMES IN THE PAST YEAR HAVE YOU HAD 5 OR MORE DRINKS IN A DAY: 0
HAVE YOU EVER FELT YOU SHOULD CUT DOWN ON YOUR DRINKING: NO
TOTAL SCORE: 0
TOTAL SCORE: 0
EVER HAD A DRINK FIRST THING IN THE MORNING TO STEADY YOUR NERVES TO GET RID OF A HANGOVER: NO
HAVE PEOPLE ANNOYED YOU BY CRITICIZING YOUR DRINKING: NO
ON A TYPICAL DAY WHEN YOU DRINK ALCOHOL HOW MANY DRINKS DO YOU HAVE: 0
TOTAL SCORE: 0
AVERAGE NUMBER OF DAYS PER WEEK YOU HAVE A DRINK CONTAINING ALCOHOL: 0
DO YOU DRINK ALCOHOL: NO
EVER FELT BAD OR GUILTY ABOUT YOUR DRINKING: NO
DOES PATIENT WANT TO STOP DRINKING: NO
CONSUMPTION TOTAL: NEGATIVE

## 2019-12-09 NOTE — ED PROVIDER NOTES
CHIEF COMPLAINT  Chief Complaint   Patient presents with   • Leg Pain     pt was seen here 11/3 for a fall. pt was asked to return for tingling to lower ext. pt c/o tingling and numbness from R knee down from 11/5       HPI  Miguel Bowen is a 29 y.o. male who presents with paresthesias to his right leg past the knee with intermittent sharp pain shooting from his buttock region on the right.  The patient has a history of degenerative disease in his lumbar spine.  He has no history of diabetes IV drugs fever cancer or other back issues.  He notes no surgeries previously on his back.  He denies any bowel or bladder incontinence.  He had a fall a little over a month ago and had imaging of his back that demonstrated no fracture.  His symptoms in his right leg have been going on for weeks.    REVIEW OF SYSTEMS  No fever cancer weight loss    PAST MEDICAL HISTORY  History reviewed. No pertinent past medical history.    FAMILY HISTORY  History reviewed. No pertinent family history.    SOCIAL HISTORY  Social History     Socioeconomic History   • Marital status: Single     Spouse name: Not on file   • Number of children: Not on file   • Years of education: Not on file   • Highest education level: Not on file   Occupational History   • Not on file   Social Needs   • Financial resource strain: Not on file   • Food insecurity:     Worry: Not on file     Inability: Not on file   • Transportation needs:     Medical: Not on file     Non-medical: Not on file   Tobacco Use   • Smoking status: Never Smoker   • Smokeless tobacco: Never Used   Substance and Sexual Activity   • Alcohol use: Yes     Comment: Rarely   • Drug use: No   • Sexual activity: Yes     Partners: Female   Lifestyle   • Physical activity:     Days per week: Not on file     Minutes per session: Not on file   • Stress: Not on file   Relationships   • Social connections:     Talks on phone: Not on file     Gets together: Not on file     Attends [employer] service:  "Not on file     Active member of club or organization: Not on file     Attends meetings of clubs or organizations: Not on file     Relationship status: Not on file   • Intimate partner violence:     Fear of current or ex partner: Not on file     Emotionally abused: Not on file     Physically abused: Not on file     Forced sexual activity: Not on file   Other Topics Concern   • Not on file   Social History Narrative   • Not on file       SURGICAL HISTORY  History reviewed. No pertinent surgical history.    CURRENT MEDICATIONS  Home Medications     Reviewed by Valdez Mills (Pharmacy Tech) on 12/09/19 at 1303  Med List Status: Complete   Medication Last Dose Status        Patient Logan Taking any Medications                       ALLERGIES  No Known Allergies    PHYSICAL EXAM  VITAL SIGNS: /71   Pulse (!) 55   Temp 36.7 °C (98 °F) (Temporal)   Resp 16   Ht 1.88 m (6' 2\")   Wt 101 kg (222 lb 10.6 oz)   SpO2 97%   BMI 28.59 kg/m²      Constitutional: Well developed, Well nourished, No acute distress, Non-toxic appearance.   HENT: Normocephalic, Atraumatic  Cardiovascular: Regular pulse  Lungs: No respiratory distress  Skin: Warm, Dry, no rash  Extremities: Patient has 5-5 strength of dorsiflexion plantarflexion both lower extremities both at the knee and ankle, pulses are 2+, sensation is somewhat diminished on the right versus left both medially and laterally, cap refill is normal, there is no swelling, there is no bony tenderness, DTRs are 2+ and symmetric bilaterally  Neurologic: Alert, appropriate, follows commands  Psychiatric: Affect normal    COURSE & MEDICAL DECISION MAKING  Pertinent Labs & Imaging studies reviewed. (See chart for details)  This is a 29-year-old male who presents with paresthesias in his right lower extremity past the knee associated with sharp pains from his buttock region consistent with likely sciatica.  He does not have any symptoms in his other leg.  Given his pain in his " low back and history of recent trauma I suspect that he likely has sciatica related paresthesias.  I do not suspect stroke.  I do not suspect Guyon Barré.  The patient will be referred to orthopedics but I also will write him a prescription for an MRI of his lumbar spine to be called to his PA last name (Dany).    FINAL IMPRESSION  1.  Paresthesias  2.  Sciatica  3.         Electronically signed by: David Rios, 12/9/2019 1:05 PM

## 2019-12-09 NOTE — ED TRIAGE NOTES
"Chief Complaint   Patient presents with   • Leg Pain     pt was seen here 11/3 for a fall. pt was asked to return for tingling to lower ext. pt c/o tingling and numbness from R knee down from 11/5     /71   Pulse (!) 55   Temp 36.7 °C (98 °F) (Temporal)   Resp 16   Ht 1.88 m (6' 2\")   Wt 101 kg (222 lb 10.6 oz)   SpO2 97%   BMI 28.59 kg/m²     "

## 2019-12-09 NOTE — ED NOTES
Pt discharged, reviewed all discharge instructions including medications and follow up, pt verbalizes understanding, and denies questions. MRI order given to pt.  Escorted to lobby.

## 2021-05-08 ENCOUNTER — HOSPITAL ENCOUNTER (EMERGENCY)
Facility: MEDICAL CENTER | Age: 31
End: 2021-05-09
Attending: EMERGENCY MEDICINE
Payer: COMMERCIAL

## 2021-05-08 DIAGNOSIS — S61.411A LACERATION OF RIGHT HAND WITHOUT FOREIGN BODY, INITIAL ENCOUNTER: ICD-10-CM

## 2021-05-08 PROCEDURE — 99282 EMERGENCY DEPT VISIT SF MDM: CPT

## 2021-05-09 VITALS
TEMPERATURE: 97.7 F | WEIGHT: 250 LBS | HEART RATE: 66 BPM | BODY MASS INDEX: 32.08 KG/M2 | SYSTOLIC BLOOD PRESSURE: 139 MMHG | HEIGHT: 74 IN | DIASTOLIC BLOOD PRESSURE: 93 MMHG | OXYGEN SATURATION: 99 % | RESPIRATION RATE: 18 BRPM

## 2021-05-09 PROCEDURE — 90471 IMMUNIZATION ADMIN: CPT

## 2021-05-09 PROCEDURE — 90715 TDAP VACCINE 7 YRS/> IM: CPT | Performed by: EMERGENCY MEDICINE

## 2021-05-09 PROCEDURE — 700111 HCHG RX REV CODE 636 W/ 250 OVERRIDE (IP): Performed by: EMERGENCY MEDICINE

## 2021-05-09 RX ADMIN — CLOSTRIDIUM TETANI TOXOID ANTIGEN (FORMALDEHYDE INACTIVATED), CORYNEBACTERIUM DIPHTHERIAE TOXOID ANTIGEN (FORMALDEHYDE INACTIVATED), BORDETELLA PERTUSSIS TOXOID ANTIGEN (GLUTARALDEHYDE INACTIVATED), BORDETELLA PERTUSSIS FILAMENTOUS HEMAGGLUTININ ANTIGEN (FORMALDEHYDE INACTIVATED), BORDETELLA PERTUSSIS PERTACTIN ANTIGEN, AND BORDETELLA PERTUSSIS FIMBRIAE 2/3 ANTIGEN 0.5 ML: 5; 2; 2.5; 5; 3; 5 INJECTION, SUSPENSION INTRAMUSCULAR at 00:39

## 2021-05-09 NOTE — ED TRIAGE NOTES
"Chief Complaint   Patient presents with   • Laceration     right hand      /89   Pulse 60   Temp 36.3 °C (97.3 °F) (Temporal)   Resp 18   Ht 1.88 m (6' 2\")   Wt 113 kg (250 lb)   SpO2 100%   BMI 32.10 kg/m²     "

## 2021-05-09 NOTE — ED NOTES
Pt cleared for d/c dischg instructions given to pt  Verbally understands   IM med given per order   D/c'ed to home in NAD

## 2021-05-09 NOTE — ED PROVIDER NOTES
"ED Provider Note    ED Provider Note    Primary care provider: Eron Saenz P.A.-C.  Means of arrival: Private vehicle  History obtained from: Patient  History limited by: None    CHIEF COMPLAINT  Chief Complaint   Patient presents with   • Laceration     right hand        HPI  Miguel Bowen is a 30 y.o. male who presents to the Emergency Department Fluid collection after laceration occurred to right hand after patient struck metallic object.  Patient denies any potential for foreign body or bone fracture at this time given superficial nature of injury.  Unclear last Tdap.  Laceration and injury occurred greater than 24 hours prior to presentation tonight.  No weakness or numbness.  No swelling or redness or streaking redness or fever.    REVIEW OF SYSTEMS  Pertinent negatives include no weakness or numbness or loss of strength.      PAST MEDICAL HISTORY       SURGICAL HISTORY  patient denies any surgical history    SOCIAL HISTORY  Social History     Tobacco Use   • Smoking status: Never Smoker   • Smokeless tobacco: Never Used   Substance Use Topics   • Alcohol use: Yes     Comment: Rarely   • Drug use: No      Social History     Substance and Sexual Activity   Drug Use No       FAMILY HISTORY  No family history on file.    CURRENT MEDICATIONS  Home Medications     Reviewed by Liz Dc R.N. (Registered Nurse) on 05/08/21 at 2355  Med List Status: Not Addressed   Medication Last Dose Status        Patient Logan Taking any Medications                       ALLERGIES  No Known Allergies    PHYSICAL EXAM    VITAL SIGNS: /93   Pulse 66   Temp 36.5 °C (97.7 °F) (Temporal)   Resp 18   Ht 1.88 m (6' 2\")   Wt 113 kg (250 lb)   SpO2 99%   BMI 32.10 kg/m²  @MIKE[[id number]::@  Pulse ox interpretation: I interpret this pulse ox as normal.  Constitutional: Alert in no apparent distress.  HENT: Normocephalic, Atraumatic, Bilateral external ears normal. Nose normal.   Eyes: Pupils are equal. Conjunctiva " normal, non-icteric.   Heart: No tachycardia.  Lungs: Nonlabored breathing  Skin: Warm, Dry, No erythema, No rash.   Neurologic: Alert, Grossly non-focal.   Psychiatric: Affect normal, Judgment normal, Mood normal, Appears appropriate and not intoxicated.   Normal radial, median, and ulnar nerve function bilaterally. Less than 3 second capillary refill and 2+ peripheral pulses bilaterally. Normal flexion and extension.  SILT distally.   3 cm superficial laceration involving the dorsum of his right hand proximal to MCP joint of fifth digit.  Wound well approximated.    LABS  Labs Reviewed - No data to display   All labs reviewed by me.    RADIOLOGY  No orders to display     The radiologist's interpretation of all radiological studies have been reviewed by me.    COURSE & MEDICAL DECISION MAKING  Nursing notes, VS, PMSFHx reviewed in chart.  I verified that the patient was wearing a mask and I was wearing appropriate PPE every time I entered the room. The patient's mask was on the patient at all times during my encounter except for a brief view of the oropharynx.     12:26 AM Patient seen and examined at bedside.     30 y.o. male p/w CC of right hand laceration without foreign body    Differential diagnosis includes is not limited to:  Given the initial laceration occurred greater than 24 hours ago and significant healing is already taken place with wound well-appearing,  I do not believe it in best interest to reopen and explore wound at this time however irrigation used to thoroughly clean and Tdap given.  Patient instructed on how to use Steri-Strips at home and Steri-Strips used to approximate skin.  Neuro intact at this time.    Medications   tetanus-dipth-acell pertussis (ADACEL) inj 0.5 mL (0.5 mL Intramuscular Given 5/9/21 0039)        DISPOSITION:  Patient will be discharged home in stable condition.    FINAL IMPRESSION  1. Laceration of right hand without foreign body, initial encounter          Electronically signed by: Jarrett Man M.D., 5/9/2021 12:26 AM

## 2021-05-27 ENCOUNTER — OFFICE VISIT (OUTPATIENT)
Dept: URGENT CARE | Facility: PHYSICIAN GROUP | Age: 31
End: 2021-05-27
Payer: COMMERCIAL

## 2021-05-27 VITALS
OXYGEN SATURATION: 97 % | SYSTOLIC BLOOD PRESSURE: 122 MMHG | TEMPERATURE: 97.6 F | WEIGHT: 244.4 LBS | DIASTOLIC BLOOD PRESSURE: 80 MMHG | HEIGHT: 74 IN | HEART RATE: 63 BPM | BODY MASS INDEX: 31.37 KG/M2 | RESPIRATION RATE: 18 BRPM

## 2021-05-27 DIAGNOSIS — Z02.4 DRIVER'S PERMIT PE (PHYSICAL EXAMINATION): ICD-10-CM

## 2021-05-27 PROCEDURE — 7100 PR DOT PHYSICAL: Performed by: NURSE PRACTITIONER

## 2021-05-27 NOTE — PROGRESS NOTES
Patient presents for DOT physical.  Exam is within normal limits, please see form scanned into the patient's chart.

## 2022-01-28 ENCOUNTER — OFFICE VISIT (OUTPATIENT)
Dept: MEDICAL GROUP | Facility: MEDICAL CENTER | Age: 32
End: 2022-01-28
Payer: COMMERCIAL

## 2022-01-28 VITALS
WEIGHT: 249.2 LBS | HEIGHT: 74 IN | SYSTOLIC BLOOD PRESSURE: 112 MMHG | BODY MASS INDEX: 31.98 KG/M2 | DIASTOLIC BLOOD PRESSURE: 72 MMHG | TEMPERATURE: 98.1 F | HEART RATE: 56 BPM | RESPIRATION RATE: 16 BRPM | OXYGEN SATURATION: 97 %

## 2022-01-28 DIAGNOSIS — Z00.00 ANNUAL PHYSICAL EXAM: ICD-10-CM

## 2022-01-28 PROBLEM — R21 RASH: Status: RESOLVED | Noted: 2018-10-15 | Resolved: 2022-01-28

## 2022-01-28 PROCEDURE — 99395 PREV VISIT EST AGE 18-39: CPT | Performed by: PHYSICIAN ASSISTANT

## 2022-01-28 ASSESSMENT — ANXIETY QUESTIONNAIRES
4. TROUBLE RELAXING: SEVERAL DAYS
6. BECOMING EASILY ANNOYED OR IRRITABLE: NOT AT ALL
GAD7 TOTAL SCORE: 6
3. WORRYING TOO MUCH ABOUT DIFFERENT THINGS: SEVERAL DAYS
7. FEELING AFRAID AS IF SOMETHING AWFUL MIGHT HAPPEN: SEVERAL DAYS
1. FEELING NERVOUS, ANXIOUS, OR ON EDGE: SEVERAL DAYS
IF YOU CHECKED OFF ANY PROBLEMS ON THIS QUESTIONNAIRE, HOW DIFFICULT HAVE THESE PROBLEMS MADE IT FOR YOU TO DO YOUR WORK, TAKE CARE OF THINGS AT HOME, OR GET ALONG WITH OTHER PEOPLE: NOT DIFFICULT AT ALL
2. NOT BEING ABLE TO STOP OR CONTROL WORRYING: SEVERAL DAYS
5. BEING SO RESTLESS THAT IT IS HARD TO SIT STILL: SEVERAL DAYS

## 2022-01-28 ASSESSMENT — PATIENT HEALTH QUESTIONNAIRE - PHQ9: CLINICAL INTERPRETATION OF PHQ2 SCORE: 0

## 2022-01-28 NOTE — PROGRESS NOTES
"Subjective:   Miguel Bowen is a 31 y.o. male here today for annual physical.    Annual physical exam  This is a pleasant 31-year-old male here today for an annual physical.  He did have issues while driving to and from [place].  He is a .  Symptoms occurred on 2 consecutive nights where he had some issues with breathing.  This was while he was driving.  Did not have to pull over to the side of the road.  Believes he might have been having anxiety.  He does not feel anxious though.  Does not feel depressed.  Does have a lot on his plate with his job as well as 5 children.  He scored a 6 on the ERA test.  He is due for labs.  His back pain is stable.  Seen by Dr. Davila in the past.  He is no longer with the [employer] because he did not want to pursue surgery at such a young age.  He does exercise on a routine basis.       Current medicines (including changes today)  No current outpatient medications on file.     No current facility-administered medications for this visit.     He  has no past medical history on file.    Social History and Family History were reviewed and updated.    ROS   No chest pain, no shortness of breath, no abdominal pain and all other systems were reviewed and are negative.       Objective:     /72 (BP Location: Right arm, Patient Position: Sitting, BP Cuff Size: Adult)   Pulse (!) 56   Temp 36.7 °C (98.1 °F) (Temporal)   Resp 16   Ht 1.88 m (6' 2\")   Wt 113 kg (249 lb 3.2 oz)   SpO2 97%  Body mass index is 32 kg/m².   Physical Exam:  Constitutional: Alert, no distress.  Skin: Warm, dry, good turgor, no rashes in visible areas.  Eye: Equal, round and reactive, conjunctiva clear, lids normal.  ENMT: Lips without lesions, good dentition, oropharynx clear.  Neck: Trachea midline, no masses.   Lymph: No cervical or supraclavicular lymphadenopathy  Respiratory: Unlabored respiratory effort, lungs appear clear, no wheezes.  Cardiovascular: Regular rate and " rhythm.  Psych: Alert and oriented x3, normal affect and mood.        Assessment and Plan:   The following treatment plan was discussed    1. Annual physical exam  Generally healthy male.  Order labs.  Must fast 8 hours.  He will be contacted with the results.  Discussed possible follow-up with therapist.  At this point he will continue to monitor symptoms.  He will contact me through [employer] with any worsening issues with anxiety.  - Lipid Profile; Future  - HEMOGLOBIN A1C; Future  - CBC WITH DIFFERENTIAL; Future  - Comp Metabolic Panel; Future  - TSH WITH REFLEX TO FT4; Future         Followup: Return in about 1 year (around 1/28/2023), or if symptoms worsen or fail to improve.    Please note that this dictation was created using voice recognition software. I have made every reasonable attempt to correct obvious errors, but I expect that there are errors of grammar and possibly content that I did not discover before finalizing the note.

## 2022-01-28 NOTE — ASSESSMENT & PLAN NOTE
This is a pleasant 31-year-old male here today for an annual physical.  He did have issues while driving to and from [place].  He is a .  Symptoms occurred on 2 consecutive nights where he had some issues with breathing.  This was while he was driving.  Did not have to pull over to the side of the road.  Believes he might have been having anxiety.  He does not feel anxious though.  Does not feel depressed.  Does have a lot on his plate with his job as well as 5 children.  He scored a 6 on the ERA test.  He is due for labs.  His back pain is stable.  Seen by Dr. Davila in the past.  He is no longer with the [employer] because he did not want to pursue surgery at such a young age.  He does exercise on a routine basis.

## 2022-02-01 ENCOUNTER — HOSPITAL ENCOUNTER (OUTPATIENT)
Dept: LAB | Facility: MEDICAL CENTER | Age: 32
End: 2022-02-01
Attending: PHYSICIAN ASSISTANT
Payer: COMMERCIAL

## 2022-02-01 DIAGNOSIS — Z00.00 ANNUAL PHYSICAL EXAM: ICD-10-CM

## 2022-02-01 LAB
ALBUMIN SERPL BCP-MCNC: 4.5 G/DL (ref 3.2–4.9)
ALBUMIN/GLOB SERPL: 1.3 G/DL
ALP SERPL-CCNC: 64 U/L (ref 30–99)
ALT SERPL-CCNC: 26 U/L (ref 2–50)
ANION GAP SERPL CALC-SCNC: 10 MMOL/L (ref 7–16)
AST SERPL-CCNC: 27 U/L (ref 12–45)
BASOPHILS # BLD AUTO: 0.6 % (ref 0–1.8)
BASOPHILS # BLD: 0.03 K/UL (ref 0–0.12)
BILIRUB SERPL-MCNC: 0.6 MG/DL (ref 0.1–1.5)
BUN SERPL-MCNC: 12 MG/DL (ref 8–22)
CALCIUM SERPL-MCNC: 9.4 MG/DL (ref 8.4–10.2)
CHLORIDE SERPL-SCNC: 103 MMOL/L (ref 96–112)
CHOLEST SERPL-MCNC: 146 MG/DL (ref 100–199)
CO2 SERPL-SCNC: 28 MMOL/L (ref 20–33)
CREAT SERPL-MCNC: 0.88 MG/DL (ref 0.5–1.4)
EOSINOPHIL # BLD AUTO: 0.05 K/UL (ref 0–0.51)
EOSINOPHIL NFR BLD: 0.9 % (ref 0–6.9)
ERYTHROCYTE [DISTWIDTH] IN BLOOD BY AUTOMATED COUNT: 42.4 FL (ref 35.9–50)
FASTING STATUS PATIENT QL REPORTED: NORMAL
GLOBULIN SER CALC-MCNC: 3.6 G/DL (ref 1.9–3.5)
GLUCOSE SERPL-MCNC: 90 MG/DL (ref 65–99)
HCT VFR BLD AUTO: 50.2 % (ref 42–52)
HDLC SERPL-MCNC: 36 MG/DL
HGB BLD-MCNC: 16.3 G/DL (ref 14–18)
IMM GRANULOCYTES # BLD AUTO: 0.01 K/UL (ref 0–0.11)
IMM GRANULOCYTES NFR BLD AUTO: 0.2 % (ref 0–0.9)
LDLC SERPL CALC-MCNC: 99 MG/DL
LYMPHOCYTES # BLD AUTO: 1.99 K/UL (ref 1–4.8)
LYMPHOCYTES NFR BLD: 36.8 % (ref 22–41)
MCH RBC QN AUTO: 29.6 PG (ref 27–33)
MCHC RBC AUTO-ENTMCNC: 32.5 G/DL (ref 33.7–35.3)
MCV RBC AUTO: 91.1 FL (ref 81.4–97.8)
MONOCYTES # BLD AUTO: 0.48 K/UL (ref 0–0.85)
MONOCYTES NFR BLD AUTO: 8.9 % (ref 0–13.4)
NEUTROPHILS # BLD AUTO: 2.85 K/UL (ref 1.82–7.42)
NEUTROPHILS NFR BLD: 52.6 % (ref 44–72)
NRBC # BLD AUTO: 0 K/UL
NRBC BLD-RTO: 0 /100 WBC
PLATELET # BLD AUTO: 202 K/UL (ref 164–446)
PMV BLD AUTO: 11.1 FL (ref 9–12.9)
POTASSIUM SERPL-SCNC: 4.5 MMOL/L (ref 3.6–5.5)
PROT SERPL-MCNC: 8.1 G/DL (ref 6–8.2)
RBC # BLD AUTO: 5.51 M/UL (ref 4.7–6.1)
SODIUM SERPL-SCNC: 141 MMOL/L (ref 135–145)
TRIGL SERPL-MCNC: 56 MG/DL (ref 0–149)
TSH SERPL DL<=0.005 MIU/L-ACNC: 1.19 UIU/ML (ref 0.38–5.33)
WBC # BLD AUTO: 5.4 K/UL (ref 4.8–10.8)

## 2022-02-01 PROCEDURE — 80053 COMPREHEN METABOLIC PANEL: CPT

## 2022-02-01 PROCEDURE — 85025 COMPLETE CBC W/AUTO DIFF WBC: CPT

## 2022-02-01 PROCEDURE — 80061 LIPID PANEL: CPT

## 2022-02-01 PROCEDURE — 84443 ASSAY THYROID STIM HORMONE: CPT

## 2022-02-01 PROCEDURE — 83036 HEMOGLOBIN GLYCOSYLATED A1C: CPT

## 2022-02-01 PROCEDURE — 36415 COLL VENOUS BLD VENIPUNCTURE: CPT

## 2022-02-02 PROBLEM — R73.03 PREDIABETES: Status: ACTIVE | Noted: 2022-02-02

## 2022-02-02 LAB
EST. AVERAGE GLUCOSE BLD GHB EST-MCNC: 117 MG/DL
HBA1C MFR BLD: 5.7 % (ref 4–5.6)

## 2022-03-14 ENCOUNTER — OFFICE VISIT (OUTPATIENT)
Dept: MEDICAL GROUP | Facility: MEDICAL CENTER | Age: 32
End: 2022-03-14
Payer: COMMERCIAL

## 2022-03-14 VITALS
WEIGHT: 233 LBS | DIASTOLIC BLOOD PRESSURE: 62 MMHG | BODY MASS INDEX: 29.9 KG/M2 | TEMPERATURE: 98.4 F | OXYGEN SATURATION: 99 % | RESPIRATION RATE: 16 BRPM | SYSTOLIC BLOOD PRESSURE: 112 MMHG | HEART RATE: 58 BPM | HEIGHT: 74 IN

## 2022-03-14 DIAGNOSIS — R21 RASH: ICD-10-CM

## 2022-03-14 DIAGNOSIS — R73.03 PREDIABETES: ICD-10-CM

## 2022-03-14 DIAGNOSIS — R06.09 DYSPNEA ON EXERTION: ICD-10-CM

## 2022-03-14 PROCEDURE — 99214 OFFICE O/P EST MOD 30 MIN: CPT | Performed by: PHYSICIAN ASSISTANT

## 2022-03-14 RX ORDER — CLOBETASOL PROPIONATE 0.5 MG/G
1 CREAM TOPICAL 2 TIMES DAILY
Qty: 60 G | Refills: 1 | Status: SHIPPED | OUTPATIENT
Start: 2022-03-14 | End: 2022-11-15 | Stop reason: SDUPTHER

## 2022-03-14 RX ORDER — METHYLPREDNISOLONE 4 MG/1
TABLET ORAL
Qty: 21 TABLET | Refills: 0 | Status: SHIPPED | OUTPATIENT
Start: 2022-03-14 | End: 2022-11-15

## 2022-03-14 ASSESSMENT — FIBROSIS 4 INDEX: FIB4 SCORE: 0.87

## 2022-03-14 NOTE — ASSESSMENT & PLAN NOTE
This is a pleasant 31-year-old male who is here today to discuss a rash that has appeared over the past month or so.  First though he states that the shortness of breath that he was experiencing is sort of changed in the more shortness of breath on exertion.  He is running more.  He runs with his children.  He is running a few miles a day.  Believes that possibly his shortness of breath is something that may have occurred from having Covid but he was never tested.  Does have 2 vaccines and has to wait 6 months for the third vaccine.  Denies any chest pain currently.

## 2022-03-14 NOTE — PROGRESS NOTES
Subjective:   Miguel Bowen is a 31 y.o. male here today for shortness of the breath on exertion, rash and prediabetes.    Dyspnea on exertion  This is a pleasant 31-year-old male who is here today to discuss a rash that has appeared over the past month or so.  First though he states that the shortness of breath that he was experiencing is sort of changed in the more shortness of breath on exertion.  He is running more.  He runs with his children.  He is running a few miles a day.  Believes that possibly his shortness of breath is something that may have occurred from having Covid but he was never tested.  Does have 2 vaccines and has to wait 6 months for the third vaccine.  Denies any chest pain currently.    Rash  States that the rash is in the upper buttocks area.  Itches.  He is tried several over-the-counter remedies.  Currently using lanolin which has caused it to improve over the weekend.  He tried Aquaphor with hydrocortisone but that seemed to exacerbate the rash.    Prediabetes  Recent A1c at 5.7.  He has been exercising and eating healthier.  His brother who is not as active as he is almost diabetic.  He has lost weight since his last office visit.       Current medicines (including changes today)  Current Outpatient Medications   Medication Sig Dispense Refill   • clobetasol (TEMOVATE) 0.05 % Cream Apply 1 Application topically 2 times a day. 60 g 1   • methylPREDNISolone (MEDROL DOSEPAK) 4 MG Tablet Therapy Pack As directed on the packaging label. 21 Tablet 0     No current facility-administered medications for this visit.     He  has no past medical history on file.    Social History and Family History were reviewed and updated.    ROS   No chest pain, no shortness of breath, no abdominal pain and all other systems were reviewed and are negative.       Objective:     /62 (BP Location: Left arm, Patient Position: Sitting, BP Cuff Size: Adult)   Pulse (!) 58   Temp 36.9 °C (98.4 °F) (Temporal)  "  Resp 16   Ht 1.88 m (6' 2\")   Wt 106 kg (233 lb)   SpO2 99%  Body mass index is 29.92 kg/m².   Physical Exam:  Constitutional: Alert, no distress.  Skin: Warm, dry, good turgor.  Notable rash below his belt line that is basically across the upper buttocks.  Erythema noted with raised lesions.  No drainage.  Dryness.  Eye: Equal, round and reactive, conjunctiva clear, lids normal.  ENMT: Lips without lesions, good dentition, oropharynx clear.  Neck: Trachea midline, no masses.   Lymph: No cervical or supraclavicular lymphadenopathy  Respiratory: Unlabored respiratory effort, lungs appear clear, no wheezes.  Cardiovascular: Regular rate and rhythm.  Psych: Alert and oriented x3, normal affect and mood.        Assessment and Plan:   The following treatment plan was discussed    1. Dyspnea on exertion  Acute, new onset condition.  Unknown cause.  We will continue to monitor.  Offered rescue inhaler but he declined.  We will see if his symptoms persist with activity.    2. Rash  Acute, new onset condition.  Does not appear to be a contact concern.  Will provide clobetasol as directed.  If in 3 days no improvement he is to start the oral steroid and continue clobetasol topically.  Expect gradual resolution.  - clobetasol (TEMOVATE) 0.05 % Cream; Apply 1 Application topically 2 times a day.  Dispense: 60 g; Refill: 1  - methylPREDNISolone (MEDROL DOSEPAK) 4 MG Tablet Therapy Pack; As directed on the packaging label.  Dispense: 21 Tablet; Refill: 0    3. Prediabetes  New condition noted in chart.  We will continue to monitor.  He has lost weight and has remained more active and eating healthier so I expect his prediabetes to either resolve or continue to remain stable.         Followup: Return if symptoms worsen or fail to improve.    Please note that this dictation was created using voice recognition software. I have made every reasonable attempt to correct obvious errors, but I expect that there are errors of " grammar and possibly content that I did not discover before finalizing the note.

## 2022-03-14 NOTE — ASSESSMENT & PLAN NOTE
States that the rash is in the upper buttocks area.  Itches.  He is tried several over-the-counter remedies.  Currently using lanolin which has caused it to improve over the weekend.  He tried Aquaphor with hydrocortisone but that seemed to exacerbate the rash.

## 2022-03-14 NOTE — ASSESSMENT & PLAN NOTE
Recent A1c at 5.7.  He has been exercising and eating healthier.  His brother who is not as active as he is almost diabetic.  He has lost weight since his last office visit.

## 2022-11-15 ENCOUNTER — OFFICE VISIT (OUTPATIENT)
Dept: MEDICAL GROUP | Facility: MEDICAL CENTER | Age: 32
End: 2022-11-15
Payer: COMMERCIAL

## 2022-11-15 VITALS
HEART RATE: 57 BPM | HEIGHT: 74 IN | SYSTOLIC BLOOD PRESSURE: 110 MMHG | BODY MASS INDEX: 29.74 KG/M2 | WEIGHT: 231.7 LBS | TEMPERATURE: 97.3 F | DIASTOLIC BLOOD PRESSURE: 50 MMHG | OXYGEN SATURATION: 97 %

## 2022-11-15 DIAGNOSIS — Z30.09 VASECTOMY EVALUATION: ICD-10-CM

## 2022-11-15 DIAGNOSIS — R73.03 PREDIABETES: ICD-10-CM

## 2022-11-15 DIAGNOSIS — Z00.00 PREVENTATIVE HEALTH CARE: ICD-10-CM

## 2022-11-15 DIAGNOSIS — L29.9 PRURITUS: ICD-10-CM

## 2022-11-15 DIAGNOSIS — Z11.59 ENCOUNTER FOR HEPATITIS C SCREENING TEST FOR LOW RISK PATIENT: ICD-10-CM

## 2022-11-15 PROBLEM — R21 RASH: Status: RESOLVED | Noted: 2018-10-15 | Resolved: 2022-11-15

## 2022-11-15 PROCEDURE — 99214 OFFICE O/P EST MOD 30 MIN: CPT | Performed by: PHYSICIAN ASSISTANT

## 2022-11-15 RX ORDER — METHYLPREDNISOLONE 4 MG/1
TABLET ORAL
Qty: 21 TABLET | Refills: 0 | Status: SHIPPED | OUTPATIENT
Start: 2022-11-15 | End: 2023-05-22 | Stop reason: SDUPTHER

## 2022-11-15 RX ORDER — HYDROXYZINE HYDROCHLORIDE 25 MG/1
25 TABLET, FILM COATED ORAL 3 TIMES DAILY PRN
Qty: 30 TABLET | Refills: 1 | Status: SHIPPED | OUTPATIENT
Start: 2022-11-15 | End: 2023-05-22

## 2022-11-15 RX ORDER — CLOBETASOL PROPIONATE 0.5 MG/G
1 CREAM TOPICAL 2 TIMES DAILY
Qty: 60 G | Refills: 1 | Status: SHIPPED | OUTPATIENT
Start: 2022-11-15

## 2022-11-15 ASSESSMENT — FIBROSIS 4 INDEX: FIB4 SCORE: 0.89

## 2022-11-15 NOTE — ASSESSMENT & PLAN NOTE
This complains of a couple week history of itching.  Is a pleasant 32-year-old male here today with his young son.  Initially had some rashes on his bilateral forearms.  Those did clear up using over-the-counter Cetaphil.  States he itches all the time.  Trying to take tepid water bathing.  No improvement.  Unsure why is itching all the time.  Denies any recent medications or supplements.

## 2022-11-15 NOTE — PROGRESS NOTES
"Subjective:   Miguel Bowen is a 32 y.o. male here today for pruritus and prediabetes.    Pruritus  This complains of a couple week history of itching.  Is a pleasant 32-year-old male here today with his young son.  Initially had some rashes on his bilateral forearms.  Those did clear up using over-the-counter Cetaphil.  States he itches all the time.  Trying to take tepid water bathing.  No improvement.  Unsure why is itching all the time.  Denies any recent medications or supplements.    Prediabetes  Recent A1c at 5.7.  Has lost weight.  Exercising routinely.  Would like updated labs.       Current medicines (including changes today)  Current Outpatient Medications   Medication Sig Dispense Refill    methylPREDNISolone (MEDROL DOSEPAK) 4 MG Tablet Therapy Pack As directed on the packaging label. 21 Tablet 0    hydrOXYzine HCl (ATARAX) 25 MG Tab Take 1 Tablet by mouth 3 times a day as needed for Itching. 30 Tablet 1    clobetasol (TEMOVATE) 0.05 % Cream Apply 1 Application topically 2 times a day. 60 g 1     No current facility-administered medications for this visit.     He  has no past medical history on file.    Social History and Family History were reviewed and updated.    ROS   No chest pain, no shortness of breath, no abdominal pain and all other systems were reviewed and are negative.       Objective:     /50 (BP Location: Left arm, Patient Position: Sitting, BP Cuff Size: Adult)   Pulse (!) 57   Temp 36.3 °C (97.3 °F) (Temporal)   Ht 1.88 m (6' 2\")   Wt 105 kg (231 lb 11.3 oz)   SpO2 97%  Body mass index is 29.75 kg/m².   Physical Exam:  Constitutional: Alert, no distress.  Skin: Warm, dry, good turgor, no rashes in visible areas.  Eye: Equal, round and reactive, conjunctiva clear, lids normal.  ENMT: Lips without lesions, good dentition, oropharynx clear.  Neck: Trachea midline, no masses.   Lymph: No cervical or supraclavicular lymphadenopathy  Respiratory: Unlabored respiratory effort, " lungs appear clear.  Psych: Alert and oriented x3, normal affect and mood.        Assessment and Plan:   The following treatment plan was discussed    1. Pruritus  Acute, new onset condition.  Unknown cause.  Likely secondary to changes in weather.  No lesions noted on his body.  Ordered labs.  Check TSH, CBC and liver kidney function.  Placed him on hydroxyzine to take as directed.  May cause drowsiness.  Cut tablet in half if needed.  Also provided clobetasol and Medrol Dosepak in case he develops lesions again.  Hopefully in time he will adapt to the conditions currently.  He continue to use over-the-counter cream liberally.  Avoid heated showering.  - TSH WITH REFLEX TO FT4; Future  - CBC WITH DIFFERENTIAL; Future  - Comp Metabolic Panel; Future  - methylPREDNISolone (MEDROL DOSEPAK) 4 MG Tablet Therapy Pack; As directed on the packaging label.  Dispense: 21 Tablet; Refill: 0  - hydrOXYzine HCl (ATARAX) 25 MG Tab; Take 1 Tablet by mouth 3 times a day as needed for Itching.  Dispense: 30 Tablet; Refill: 1  - Sed Rate; Future  - clobetasol (TEMOVATE) 0.05 % Cream; Apply 1 Application topically 2 times a day.  Dispense: 60 g; Refill: 1    2. Prediabetes  Chronic condition.  Continue lifestyle modifications.  Weight loss noted.  We will check A1c and follow-up.  Nonfasting.    3. Vasectomy evaluation  Referred to urology [place] for vasectomy.   - Referral to Urology    4. Preventative health care  Ordered labs.  Nonfasting.  - TSH WITH REFLEX TO FT4; Future  - CBC WITH DIFFERENTIAL; Future  - Comp Metabolic Panel; Future  - HEMOGLOBIN A1C; Future    5. Encounter for hepatitis C screening test for low risk patient  Hep C viral antibody ordered.  Low risk.  - HEP C VIRUS ANTIBODY; Future         Followup: Return in 6 months (on 5/15/2023), or if symptoms worsen or fail to improve.    Please note that this dictation was created using voice recognition software. I have made every reasonable attempt to correct obvious  errors, but I expect that there are errors of grammar and possibly content that I did not discover before finalizing the note.

## 2023-05-22 ENCOUNTER — OFFICE VISIT (OUTPATIENT)
Dept: MEDICAL GROUP | Facility: MEDICAL CENTER | Age: 33
End: 2023-05-22
Payer: COMMERCIAL

## 2023-05-22 VITALS
DIASTOLIC BLOOD PRESSURE: 54 MMHG | SYSTOLIC BLOOD PRESSURE: 116 MMHG | BODY MASS INDEX: 29.75 KG/M2 | HEART RATE: 54 BPM | OXYGEN SATURATION: 99 % | HEIGHT: 74 IN | WEIGHT: 231.8 LBS | TEMPERATURE: 97.3 F

## 2023-05-22 DIAGNOSIS — Z11.59 ENCOUNTER FOR HEPATITIS C SCREENING TEST FOR LOW RISK PATIENT: ICD-10-CM

## 2023-05-22 DIAGNOSIS — L40.9 PSORIASIS: ICD-10-CM

## 2023-05-22 DIAGNOSIS — Z00.00 PREVENTATIVE HEALTH CARE: ICD-10-CM

## 2023-05-22 PROBLEM — L29.9 PRURITUS: Status: RESOLVED | Noted: 2022-11-15 | Resolved: 2023-05-22

## 2023-05-22 PROCEDURE — 3074F SYST BP LT 130 MM HG: CPT | Performed by: PHYSICIAN ASSISTANT

## 2023-05-22 PROCEDURE — 3078F DIAST BP <80 MM HG: CPT | Performed by: PHYSICIAN ASSISTANT

## 2023-05-22 PROCEDURE — 99214 OFFICE O/P EST MOD 30 MIN: CPT | Performed by: PHYSICIAN ASSISTANT

## 2023-05-22 RX ORDER — METHYLPREDNISOLONE 4 MG/1
TABLET ORAL
Qty: 21 TABLET | Refills: 0 | Status: SHIPPED | OUTPATIENT
Start: 2023-05-22

## 2023-05-22 ASSESSMENT — FIBROSIS 4 INDEX: FIB4 SCORE: 0.89

## 2023-05-22 ASSESSMENT — PATIENT HEALTH QUESTIONNAIRE - PHQ9: CLINICAL INTERPRETATION OF PHQ2 SCORE: 0

## 2023-05-22 NOTE — PROGRESS NOTES
"Subjective:   Miguel Bowen is a 32 y.o. male here today for rash on back for 2 weeks with a chronic history of rash and preventative health care.    Psoriasis  This is a pleasant 32-year-old male comes in complaining of a rash on his back.  Has been worse over the past 2 weeks.  We have discussed the rash in the past.  He has been seen by [place] [facility].  He states that they performed a biopsy on the back but it showed acne.  Lesion today looks different than what it has looked previously.  He states it does not itch.  At times will burn.  Does cross midline into the back.  Mostly on the left side.  He has been using clobetasol cream but no improvement at this time.  Last time I did provide a Medrol Dosepak which was effective.  He also has not done labs ordered previously.  He does have a history of prediabetes.       Current medicines (including changes today)  Current Outpatient Medications   Medication Sig Dispense Refill    methylPREDNISolone (MEDROL DOSEPAK) 4 MG Tablet Therapy Pack As directed on the packaging label. 21 Tablet 0    clobetasol (TEMOVATE) 0.05 % Cream Apply 1 Application topically 2 times a day. 60 g 1     No current facility-administered medications for this visit.     He  has no past medical history on file.    Social History and Family History were reviewed and updated.    ROS   No chest pain, no shortness of breath, no abdominal pain and all other systems were reviewed and are negative.       Objective:     /54 (BP Location: Right arm, Patient Position: Sitting, BP Cuff Size: Adult)   Pulse (!) 54   Temp 36.3 °C (97.3 °F) (Temporal)   Ht 1.88 m (6' 2\")   Wt 105 kg (231 lb 12.8 oz)   SpO2 99%  Body mass index is 29.76 kg/m².   Physical Exam:  Constitutional: Alert, no distress.  Skin: Warm, dry, good turgor, no rashes in visible areas.  Back there are couple circular large localized lesions.  Very to cross midline.  Erythematous raised and plaque appearing.  Eye: Equal, " round and reactive, conjunctiva clear, lids normal.  ENMT: Lips without lesions, good dentition, oropharynx clear.  Neck: Trachea midline, no masses.   Lymph: No cervical or supraclavicular lymphadenopathy  Respiratory: Unlabored respiratory effort, lungs appear clear.  Psych: Alert and oriented x3, normal affect and mood.        Assessment and Plan:   The following treatment plan was discussed    1. Psoriasis  New condition noted in chart.  Likely chronic.  Today the lesions appear to be secondary to psoriasis.  Does not appear to be herpetic in nature.  Does not appear to be acne.  Provided Medrol Dosepak as well as advised to continue clobetasol cream.  Follow-up with dermatology and we did a referral to a local renal group.  - Referral to Dermatology  - methylPREDNISolone (MEDROL DOSEPAK) 4 MG Tablet Therapy Pack; As directed on the packaging label.  Dispense: 21 Tablet; Refill: 0    2. Encounter for hepatitis C screening test for low risk patient  Hep C viral antibody ordered.  Low risk.  - HEP C VIRUS ANTIBODY; Future    3. Preventative health care  Ordered labs.  Fast 8 hours.  He will be contacted with the results.  - CBC WITH DIFFERENTIAL; Future  - Comp Metabolic Panel; Future  - HEMOGLOBIN A1C; Future  - TSH WITH REFLEX TO FT4; Future  - Lipid Profile; Future         Followup: Return if symptoms worsen or fail to improve.    Please note that this dictation was created using voice recognition software. I have made every reasonable attempt to correct obvious errors, but I expect that there are errors of grammar and possibly content that I did not discover before finalizing the note.

## 2023-05-22 NOTE — ASSESSMENT & PLAN NOTE
This is a pleasant 32-year-old male comes in complaining of a rash on his back.  Has been worse over the past 2 weeks.  We have discussed the rash in the past.  He has been seen by [place] [facility].  He states that they performed a biopsy on the back but it showed acne.  Lesion today looks different than what it has looked previously.  He states it does not itch.  At times will burn.  Does cross midline into the back.  Mostly on the left side.  He has been using clobetasol cream but no improvement at this time.  Last time I did provide a Medrol Dosepak which was effective.  He also has not done labs ordered previously.  He does have a history of prediabetes.

## 2023-06-15 ENCOUNTER — HOSPITAL ENCOUNTER (OUTPATIENT)
Dept: LAB | Facility: MEDICAL CENTER | Age: 33
End: 2023-06-15
Attending: PHYSICIAN ASSISTANT
Payer: COMMERCIAL

## 2023-06-15 DIAGNOSIS — L29.9 PRURITUS: ICD-10-CM

## 2023-06-15 DIAGNOSIS — Z11.59 ENCOUNTER FOR HEPATITIS C SCREENING TEST FOR LOW RISK PATIENT: ICD-10-CM

## 2023-06-15 DIAGNOSIS — Z00.00 PREVENTATIVE HEALTH CARE: ICD-10-CM

## 2023-06-15 LAB
ALBUMIN SERPL BCP-MCNC: 4.5 G/DL (ref 3.2–4.9)
ALBUMIN/GLOB SERPL: 1.3 G/DL
ALP SERPL-CCNC: 59 U/L (ref 30–99)
ALT SERPL-CCNC: 17 U/L (ref 2–50)
ANION GAP SERPL CALC-SCNC: 12 MMOL/L (ref 7–16)
AST SERPL-CCNC: 21 U/L (ref 12–45)
BASOPHILS # BLD AUTO: 0.4 % (ref 0–1.8)
BASOPHILS # BLD: 0.03 K/UL (ref 0–0.12)
BILIRUB SERPL-MCNC: 0.8 MG/DL (ref 0.1–1.5)
BUN SERPL-MCNC: 14 MG/DL (ref 8–22)
CALCIUM ALBUM COR SERPL-MCNC: 9 MG/DL (ref 8.5–10.5)
CALCIUM SERPL-MCNC: 9.4 MG/DL (ref 8.4–10.2)
CHLORIDE SERPL-SCNC: 101 MMOL/L (ref 96–112)
CHOLEST SERPL-MCNC: 188 MG/DL (ref 100–199)
CO2 SERPL-SCNC: 25 MMOL/L (ref 20–33)
CREAT SERPL-MCNC: 0.96 MG/DL (ref 0.5–1.4)
EOSINOPHIL # BLD AUTO: 0.03 K/UL (ref 0–0.51)
EOSINOPHIL NFR BLD: 0.4 % (ref 0–6.9)
ERYTHROCYTE [DISTWIDTH] IN BLOOD BY AUTOMATED COUNT: 43.7 FL (ref 35.9–50)
ERYTHROCYTE [SEDIMENTATION RATE] IN BLOOD BY WESTERGREN METHOD: 4 MM/HOUR (ref 0–20)
EST. AVERAGE GLUCOSE BLD GHB EST-MCNC: 126 MG/DL
FASTING STATUS PATIENT QL REPORTED: NORMAL
GFR SERPLBLD CREATININE-BSD FMLA CKD-EPI: 106 ML/MIN/1.73 M 2
GLOBULIN SER CALC-MCNC: 3.4 G/DL (ref 1.9–3.5)
GLUCOSE SERPL-MCNC: 94 MG/DL (ref 65–99)
HBA1C MFR BLD: 6 % (ref 4–5.6)
HCT VFR BLD AUTO: 45.8 % (ref 42–52)
HDLC SERPL-MCNC: 50 MG/DL
HGB BLD-MCNC: 15.4 G/DL (ref 14–18)
IMM GRANULOCYTES # BLD AUTO: 0.03 K/UL (ref 0–0.11)
IMM GRANULOCYTES NFR BLD AUTO: 0.4 % (ref 0–0.9)
LDLC SERPL CALC-MCNC: 122 MG/DL
LYMPHOCYTES # BLD AUTO: 1.11 K/UL (ref 1–4.8)
LYMPHOCYTES NFR BLD: 13.1 % (ref 22–41)
MCH RBC QN AUTO: 30.4 PG (ref 27–33)
MCHC RBC AUTO-ENTMCNC: 33.6 G/DL (ref 32.3–36.5)
MCV RBC AUTO: 90.5 FL (ref 81.4–97.8)
MONOCYTES # BLD AUTO: 0.44 K/UL (ref 0–0.85)
MONOCYTES NFR BLD AUTO: 5.2 % (ref 0–13.4)
NEUTROPHILS # BLD AUTO: 6.86 K/UL (ref 1.82–7.42)
NEUTROPHILS NFR BLD: 80.5 % (ref 44–72)
NRBC # BLD AUTO: 0 K/UL
NRBC BLD-RTO: 0 /100 WBC (ref 0–0.2)
PLATELET # BLD AUTO: 192 K/UL (ref 164–446)
PMV BLD AUTO: 11 FL (ref 9–12.9)
POTASSIUM SERPL-SCNC: 4.2 MMOL/L (ref 3.6–5.5)
PROT SERPL-MCNC: 7.9 G/DL (ref 6–8.2)
RBC # BLD AUTO: 5.06 M/UL (ref 4.7–6.1)
SODIUM SERPL-SCNC: 138 MMOL/L (ref 135–145)
TRIGL SERPL-MCNC: 79 MG/DL (ref 0–149)
TSH SERPL DL<=0.005 MIU/L-ACNC: 1.03 UIU/ML (ref 0.38–5.33)
WBC # BLD AUTO: 8.5 K/UL (ref 4.8–10.8)

## 2023-06-15 PROCEDURE — 84443 ASSAY THYROID STIM HORMONE: CPT

## 2023-06-15 PROCEDURE — 80061 LIPID PANEL: CPT

## 2023-06-15 PROCEDURE — 80053 COMPREHEN METABOLIC PANEL: CPT

## 2023-06-15 PROCEDURE — 36415 COLL VENOUS BLD VENIPUNCTURE: CPT

## 2023-06-15 PROCEDURE — 85652 RBC SED RATE AUTOMATED: CPT

## 2023-06-15 PROCEDURE — 83036 HEMOGLOBIN GLYCOSYLATED A1C: CPT

## 2023-06-15 PROCEDURE — 85025 COMPLETE CBC W/AUTO DIFF WBC: CPT

## 2023-06-15 PROCEDURE — 86803 HEPATITIS C AB TEST: CPT

## 2023-06-16 LAB — HCV AB SER QL: NORMAL

## 2023-12-02 ENCOUNTER — HOSPITAL ENCOUNTER (OUTPATIENT)
Facility: MEDICAL CENTER | Age: 33
End: 2023-12-02
Attending: PHYSICIAN ASSISTANT
Payer: COMMERCIAL

## 2023-12-02 ENCOUNTER — OFFICE VISIT (OUTPATIENT)
Dept: URGENT CARE | Facility: CLINIC | Age: 33
End: 2023-12-02
Payer: COMMERCIAL

## 2023-12-02 VITALS
HEIGHT: 74 IN | WEIGHT: 230 LBS | TEMPERATURE: 97.1 F | HEART RATE: 64 BPM | DIASTOLIC BLOOD PRESSURE: 70 MMHG | OXYGEN SATURATION: 97 % | RESPIRATION RATE: 18 BRPM | SYSTOLIC BLOOD PRESSURE: 128 MMHG | BODY MASS INDEX: 29.52 KG/M2

## 2023-12-02 DIAGNOSIS — J02.9 SORE THROAT: ICD-10-CM

## 2023-12-02 LAB — S PYO DNA SPEC NAA+PROBE: NOT DETECTED

## 2023-12-02 PROCEDURE — 87070 CULTURE OTHR SPECIMN AEROBIC: CPT

## 2023-12-02 PROCEDURE — 99213 OFFICE O/P EST LOW 20 MIN: CPT | Performed by: PHYSICIAN ASSISTANT

## 2023-12-02 PROCEDURE — 3074F SYST BP LT 130 MM HG: CPT | Performed by: PHYSICIAN ASSISTANT

## 2023-12-02 PROCEDURE — 87651 STREP A DNA AMP PROBE: CPT | Performed by: PHYSICIAN ASSISTANT

## 2023-12-02 PROCEDURE — 3078F DIAST BP <80 MM HG: CPT | Performed by: PHYSICIAN ASSISTANT

## 2023-12-02 RX ORDER — AMOXICILLIN 500 MG/1
500 CAPSULE ORAL 2 TIMES DAILY
Qty: 20 CAPSULE | Refills: 0 | Status: SHIPPED | OUTPATIENT
Start: 2023-12-02 | End: 2023-12-12

## 2023-12-02 ASSESSMENT — FIBROSIS 4 INDEX: FIB4 SCORE: 0.93

## 2023-12-03 DIAGNOSIS — J02.9 SORE THROAT: ICD-10-CM

## 2023-12-03 NOTE — PROGRESS NOTES
Subjective     Marcus Rolon is a 33 y.o. male who presents with Pharyngitis (X 5 days)    PMH:  has no past medical history on file.  MEDS:   Current Outpatient Medications:     amoxicillin (AMOXIL) 500 MG Cap, Take 1 Capsule by mouth 2 times a day for 10 days., Disp: 20 Capsule, Rfl: 0    methylPREDNISolone (MEDROL DOSEPAK) 4 MG Tablet Therapy Pack, As directed on the packaging label. (Patient not taking: Reported on 12/2/2023), Disp: 21 Tablet, Rfl: 0    clobetasol (TEMOVATE) 0.05 % Cream, Apply 1 Application topically 2 times a day. (Patient not taking: Reported on 12/2/2023), Disp: 60 g, Rfl: 1  ALLERGIES: No Known Allergies  SURGHX: History reviewed. No pertinent surgical history.  SOCHX:  reports that he has never smoked. He has never used smokeless tobacco. He reports that he does not currently use alcohol. He reports that he does not use drugs.  FH: Reviewed with patient, not pertinent to this visit.           Patient presents with sore throat x 5 days with fever, chills and body aches without congestion, cough, nvd. PT has been taking otc ibuprofen, salt water gargles with no change in his symptoms.  Pt has had strep throat in the past, feels similar.  No other complaints.         Pharyngitis   This is a new problem. The current episode started in the past 7 days. The problem has been unchanged. Neither side of throat is experiencing more pain than the other. Maximum temperature: subjective. The pain is moderate. Associated symptoms include headaches and swollen glands. Pertinent negatives include no congestion, coughing, drooling, plugged ear sensation, shortness of breath or trouble swallowing. He has tried cool liquids, NSAIDs and gargles for the symptoms. The treatment provided no relief.       Review of Systems   Constitutional:  Positive for fever and malaise/fatigue.   HENT:  Positive for sore throat. Negative for congestion, drooling and trouble swallowing.    Respiratory:  Negative for cough,  "sputum production and shortness of breath.    Neurological:  Positive for headaches.   All other systems reviewed and are negative.             Objective     /70 (BP Location: Left arm, Patient Position: Sitting, BP Cuff Size: Large adult)   Pulse 64   Temp 36.2 °C (97.1 °F)   Resp 18   Ht 1.88 m (6' 2\")   Wt 104 kg (230 lb)   SpO2 97%   BMI 29.53 kg/m²      Physical Exam  Vitals and nursing note reviewed.   Constitutional:       General: He is not in acute distress.     Appearance: Normal appearance. He is well-developed and normal weight. He is not ill-appearing or toxic-appearing.   HENT:      Head: Normocephalic.      Right Ear: Tympanic membrane normal.      Left Ear: Tympanic membrane normal.      Nose: Nose normal.      Mouth/Throat:      Pharynx: Uvula midline. Posterior oropharyngeal erythema present. No oropharyngeal exudate.      Tonsils: No tonsillar abscesses.   Eyes:      Extraocular Movements: Extraocular movements intact.      Conjunctiva/sclera: Conjunctivae normal.      Pupils: Pupils are equal, round, and reactive to light.   Cardiovascular:      Rate and Rhythm: Normal rate and regular rhythm.      Heart sounds: Normal heart sounds.   Pulmonary:      Effort: Pulmonary effort is normal.      Breath sounds: Normal breath sounds.   Abdominal:      Palpations: Abdomen is soft.   Musculoskeletal:         General: Normal range of motion.      Cervical back: Normal range of motion and neck supple.   Lymphadenopathy:      Cervical: Cervical adenopathy present.   Skin:     General: Skin is warm and dry.      Capillary Refill: Capillary refill takes less than 2 seconds.   Neurological:      Mental Status: He is alert and oriented to person, place, and time.      Gait: Gait normal.   Psychiatric:         Mood and Affect: Mood normal.                   Assessment & Plan             1. Sore throat  POCT CEPHEID GROUP A STREP - PCR    amoxicillin (AMOXIL) 500 MG Cap    CULTURE THROAT        PT " meets [facility] criteria for treatment for sore throat.  Pt has sore throat, fever, swollen lymph nodes and absence of fever. PT would like throat culture, so culture sent to lab.      Pt to begin amoxicillin today, if throat culture is negative, pt is aware he is to discontinue amoxicillin.    Motrin/Advil/Ibuprophen 600 mg every 6 hours as needed for pain or fever.    PT advised saltwater gargles/swishes  3-4 times daily until symptoms improve.     Differential diagnosis, supportive care, and indications for immediate follow-up discussed with patient.  Instructed to return to clinic or nearest emergency department for any change in condition, further concerns, or worsening of symptoms.    I personally reviewed prior external notes and test results pertinent to today's visit.  I have independently reviewed and interpreted all diagnostics ordered during this urgent care visit.    PT should follow up with PCP in 1-2 days for re-evaluation if symptoms have not improved.      Discussed red flags and reasons to return to UC or ED.      Pt and/or family verbalized understanding of diagnosis and follow up instructions and was offered informational handout on diagnosis.  PT discharged.     Please note that this dictation was created using voice recognition software. I have made every reasonable attempt to correct obvious errors, but I expect that there may be errors of grammar and possibly content that I did not discover before finalizing the note.

## 2023-12-05 LAB
BACTERIA SPEC RESP CULT: NORMAL
SIGNIFICANT IND 70042: NORMAL
SITE SITE: NORMAL
SOURCE SOURCE: NORMAL

## 2023-12-06 ASSESSMENT — ENCOUNTER SYMPTOMS
COUGH: 0
TROUBLE SWALLOWING: 0
SPUTUM PRODUCTION: 0
SHORTNESS OF BREATH: 0
SWOLLEN GLANDS: 1
FEVER: 1
HEADACHES: 1
SORE THROAT: 1

## 2024-06-04 ENCOUNTER — APPOINTMENT (OUTPATIENT)
Dept: URGENT CARE | Facility: CLINIC | Age: 34
End: 2024-06-04
Payer: COMMERCIAL

## 2024-08-12 ENCOUNTER — APPOINTMENT (OUTPATIENT)
Dept: ADMISSIONS | Facility: MEDICAL CENTER | Age: 34
End: 2024-08-12
Attending: ORTHOPAEDIC SURGERY
Payer: COMMERCIAL

## 2024-08-16 ENCOUNTER — PRE-ADMISSION TESTING (OUTPATIENT)
Dept: ADMISSIONS | Facility: MEDICAL CENTER | Age: 34
End: 2024-08-16
Attending: ORTHOPAEDIC SURGERY
Payer: COMMERCIAL

## 2024-08-16 VITALS — BODY MASS INDEX: 29.53 KG/M2 | HEIGHT: 74 IN

## 2024-08-16 DIAGNOSIS — Z01.812 PRE-OPERATIVE LABORATORY EXAMINATION: ICD-10-CM

## 2024-08-16 RX ORDER — CALCIUM CARBONATE/VITAMIN D3 600MG-62.5
1 CAPSULE ORAL DAILY
COMMUNITY

## 2024-08-16 RX ORDER — ACETAMINOPHEN/DIPHENHYDRAMINE 500MG-25MG
1-2 TABLET ORAL NIGHTLY PRN
COMMUNITY

## 2024-08-16 NOTE — PREPROCEDURE INSTRUCTIONS
Pt preadmitted via phone, instructions emailed. Questions answered.Patient instructed per pharmacy/anesthesia guidelines regarding taking, holding or contacting provider for instructions on regularly prescribed medications before surgery. Instructed to follow instructions as directed on medication record given to patient. - METs score >4.

## 2024-08-19 ENCOUNTER — APPOINTMENT (OUTPATIENT)
Dept: ADMISSIONS | Facility: MEDICAL CENTER | Age: 34
End: 2024-08-19
Attending: ORTHOPAEDIC SURGERY
Payer: COMMERCIAL

## 2024-08-19 DIAGNOSIS — Z01.812 PRE-OPERATIVE LABORATORY EXAMINATION: ICD-10-CM

## 2024-08-19 LAB
EST. AVERAGE GLUCOSE BLD GHB EST-MCNC: 114 MG/DL
HBA1C MFR BLD: 5.6 % (ref 4–5.6)

## 2024-08-19 PROCEDURE — 36415 COLL VENOUS BLD VENIPUNCTURE: CPT

## 2024-08-19 PROCEDURE — 83036 HEMOGLOBIN GLYCOSYLATED A1C: CPT

## 2024-08-21 ENCOUNTER — ANESTHESIA EVENT (OUTPATIENT)
Dept: SURGERY | Facility: MEDICAL CENTER | Age: 34
End: 2024-08-21
Payer: COMMERCIAL

## 2024-08-22 ENCOUNTER — ANESTHESIA (OUTPATIENT)
Dept: SURGERY | Facility: MEDICAL CENTER | Age: 34
End: 2024-08-22
Payer: COMMERCIAL

## 2024-08-22 ENCOUNTER — HOSPITAL ENCOUNTER (OUTPATIENT)
Facility: MEDICAL CENTER | Age: 34
End: 2024-08-22
Attending: ORTHOPAEDIC SURGERY | Admitting: ORTHOPAEDIC SURGERY
Payer: COMMERCIAL

## 2024-08-22 VITALS
HEART RATE: 59 BPM | DIASTOLIC BLOOD PRESSURE: 77 MMHG | BODY MASS INDEX: 28.17 KG/M2 | HEIGHT: 74 IN | SYSTOLIC BLOOD PRESSURE: 131 MMHG | RESPIRATION RATE: 16 BRPM | OXYGEN SATURATION: 98 % | WEIGHT: 219.47 LBS | TEMPERATURE: 97.2 F

## 2024-08-22 PROCEDURE — 160046 HCHG PACU - 1ST 60 MINS PHASE II: Performed by: ORTHOPAEDIC SURGERY

## 2024-08-22 PROCEDURE — 700111 HCHG RX REV CODE 636 W/ 250 OVERRIDE (IP): Performed by: ANESTHESIOLOGY

## 2024-08-22 PROCEDURE — 160048 HCHG OR STATISTICAL LEVEL 1-5: Performed by: ORTHOPAEDIC SURGERY

## 2024-08-22 PROCEDURE — 160035 HCHG PACU - 1ST 60 MINS PHASE I: Performed by: ORTHOPAEDIC SURGERY

## 2024-08-22 PROCEDURE — 160025 RECOVERY II MINUTES (STATS): Performed by: ORTHOPAEDIC SURGERY

## 2024-08-22 PROCEDURE — 160036 HCHG PACU - EA ADDL 30 MINS PHASE I: Performed by: ORTHOPAEDIC SURGERY

## 2024-08-22 PROCEDURE — 700101 HCHG RX REV CODE 250: Performed by: ANESTHESIOLOGY

## 2024-08-22 PROCEDURE — 700105 HCHG RX REV CODE 258: Performed by: ORTHOPAEDIC SURGERY

## 2024-08-22 PROCEDURE — C1713 ANCHOR/SCREW BN/BN,TIS/BN: HCPCS | Performed by: ORTHOPAEDIC SURGERY

## 2024-08-22 PROCEDURE — 160041 HCHG SURGERY MINUTES - EA ADDL 1 MIN LEVEL 4: Performed by: ORTHOPAEDIC SURGERY

## 2024-08-22 PROCEDURE — 160002 HCHG RECOVERY MINUTES (STAT): Performed by: ORTHOPAEDIC SURGERY

## 2024-08-22 PROCEDURE — 160009 HCHG ANES TIME/MIN: Performed by: ORTHOPAEDIC SURGERY

## 2024-08-22 PROCEDURE — 160029 HCHG SURGERY MINUTES - 1ST 30 MINS LEVEL 4: Performed by: ORTHOPAEDIC SURGERY

## 2024-08-22 DEVICE — DEVICE FIXATION CURVE UP AIR+ 15 D (1EA/BX): Type: IMPLANTABLE DEVICE | Site: KNEE | Status: FUNCTIONAL

## 2024-08-22 RX ORDER — CEFAZOLIN SODIUM 1 G/3ML
INJECTION, POWDER, FOR SOLUTION INTRAMUSCULAR; INTRAVENOUS PRN
Status: DISCONTINUED | OUTPATIENT
Start: 2024-08-22 | End: 2024-08-22 | Stop reason: SURG

## 2024-08-22 RX ORDER — HYDROMORPHONE HYDROCHLORIDE 1 MG/ML
0.2 INJECTION, SOLUTION INTRAMUSCULAR; INTRAVENOUS; SUBCUTANEOUS
Status: DISCONTINUED | OUTPATIENT
Start: 2024-08-22 | End: 2024-08-22 | Stop reason: HOSPADM

## 2024-08-22 RX ORDER — SODIUM CHLORIDE, SODIUM LACTATE, POTASSIUM CHLORIDE, CALCIUM CHLORIDE 600; 310; 30; 20 MG/100ML; MG/100ML; MG/100ML; MG/100ML
INJECTION, SOLUTION INTRAVENOUS CONTINUOUS
Status: DISCONTINUED | OUTPATIENT
Start: 2024-08-22 | End: 2024-08-22 | Stop reason: HOSPADM

## 2024-08-22 RX ORDER — EPHEDRINE SULFATE 50 MG/ML
5 INJECTION, SOLUTION INTRAVENOUS
Status: DISCONTINUED | OUTPATIENT
Start: 2024-08-22 | End: 2024-08-22 | Stop reason: HOSPADM

## 2024-08-22 RX ORDER — DIPHENHYDRAMINE HYDROCHLORIDE 50 MG/ML
12.5 INJECTION INTRAMUSCULAR; INTRAVENOUS
Status: DISCONTINUED | OUTPATIENT
Start: 2024-08-22 | End: 2024-08-22 | Stop reason: HOSPADM

## 2024-08-22 RX ORDER — TRANEXAMIC ACID 100 MG/ML
INJECTION, SOLUTION INTRAVENOUS PRN
Status: DISCONTINUED | OUTPATIENT
Start: 2024-08-22 | End: 2024-08-22 | Stop reason: SURG

## 2024-08-22 RX ORDER — ALBUTEROL SULFATE 5 MG/ML
2.5 SOLUTION RESPIRATORY (INHALATION)
Status: DISCONTINUED | OUTPATIENT
Start: 2024-08-22 | End: 2024-08-22 | Stop reason: HOSPADM

## 2024-08-22 RX ORDER — HYDROMORPHONE HYDROCHLORIDE 1 MG/ML
0.4 INJECTION, SOLUTION INTRAMUSCULAR; INTRAVENOUS; SUBCUTANEOUS
Status: DISCONTINUED | OUTPATIENT
Start: 2024-08-22 | End: 2024-08-22 | Stop reason: HOSPADM

## 2024-08-22 RX ORDER — SODIUM CHLORIDE, SODIUM LACTATE, POTASSIUM CHLORIDE, CALCIUM CHLORIDE 600; 310; 30; 20 MG/100ML; MG/100ML; MG/100ML; MG/100ML
INJECTION, SOLUTION INTRAVENOUS CONTINUOUS
Status: ACTIVE | OUTPATIENT
Start: 2024-08-22 | End: 2024-08-22

## 2024-08-22 RX ORDER — DEXAMETHASONE SODIUM PHOSPHATE 4 MG/ML
INJECTION, SOLUTION INTRA-ARTICULAR; INTRALESIONAL; INTRAMUSCULAR; INTRAVENOUS; SOFT TISSUE PRN
Status: DISCONTINUED | OUTPATIENT
Start: 2024-08-22 | End: 2024-08-22 | Stop reason: SURG

## 2024-08-22 RX ORDER — HYDRALAZINE HYDROCHLORIDE 20 MG/ML
5 INJECTION INTRAMUSCULAR; INTRAVENOUS
Status: DISCONTINUED | OUTPATIENT
Start: 2024-08-22 | End: 2024-08-22 | Stop reason: HOSPADM

## 2024-08-22 RX ORDER — MEPERIDINE HYDROCHLORIDE 25 MG/ML
6.25 INJECTION INTRAMUSCULAR; INTRAVENOUS; SUBCUTANEOUS
Status: DISCONTINUED | OUTPATIENT
Start: 2024-08-22 | End: 2024-08-22 | Stop reason: HOSPADM

## 2024-08-22 RX ORDER — HYDROMORPHONE HYDROCHLORIDE 1 MG/ML
0.1 INJECTION, SOLUTION INTRAMUSCULAR; INTRAVENOUS; SUBCUTANEOUS
Status: DISCONTINUED | OUTPATIENT
Start: 2024-08-22 | End: 2024-08-22 | Stop reason: HOSPADM

## 2024-08-22 RX ORDER — KETOROLAC TROMETHAMINE 15 MG/ML
INJECTION, SOLUTION INTRAMUSCULAR; INTRAVENOUS PRN
Status: DISCONTINUED | OUTPATIENT
Start: 2024-08-22 | End: 2024-08-22 | Stop reason: SURG

## 2024-08-22 RX ORDER — ACETAMINOPHEN 500 MG
1000 TABLET ORAL ONCE
Status: DISCONTINUED | OUTPATIENT
Start: 2024-08-22 | End: 2024-08-22 | Stop reason: HOSPADM

## 2024-08-22 RX ORDER — HALOPERIDOL 5 MG/ML
1 INJECTION INTRAMUSCULAR
Status: DISCONTINUED | OUTPATIENT
Start: 2024-08-22 | End: 2024-08-22 | Stop reason: HOSPADM

## 2024-08-22 RX ORDER — OXYCODONE HCL 5 MG/5 ML
5 SOLUTION, ORAL ORAL
Status: DISCONTINUED | OUTPATIENT
Start: 2024-08-22 | End: 2024-08-22 | Stop reason: HOSPADM

## 2024-08-22 RX ORDER — MIDAZOLAM HYDROCHLORIDE 1 MG/ML
INJECTION INTRAMUSCULAR; INTRAVENOUS PRN
Status: DISCONTINUED | OUTPATIENT
Start: 2024-08-22 | End: 2024-08-22 | Stop reason: SURG

## 2024-08-22 RX ORDER — LIDOCAINE HYDROCHLORIDE 20 MG/ML
INJECTION, SOLUTION EPIDURAL; INFILTRATION; INTRACAUDAL; PERINEURAL PRN
Status: DISCONTINUED | OUTPATIENT
Start: 2024-08-22 | End: 2024-08-22 | Stop reason: SURG

## 2024-08-22 RX ORDER — ONDANSETRON 2 MG/ML
4 INJECTION INTRAMUSCULAR; INTRAVENOUS
Status: DISCONTINUED | OUTPATIENT
Start: 2024-08-22 | End: 2024-08-22 | Stop reason: HOSPADM

## 2024-08-22 RX ORDER — ONDANSETRON 2 MG/ML
INJECTION INTRAMUSCULAR; INTRAVENOUS PRN
Status: DISCONTINUED | OUTPATIENT
Start: 2024-08-22 | End: 2024-08-22 | Stop reason: SURG

## 2024-08-22 RX ORDER — LABETALOL HYDROCHLORIDE 5 MG/ML
5 INJECTION, SOLUTION INTRAVENOUS
Status: DISCONTINUED | OUTPATIENT
Start: 2024-08-22 | End: 2024-08-22 | Stop reason: HOSPADM

## 2024-08-22 RX ORDER — OXYCODONE HCL 5 MG/5 ML
10 SOLUTION, ORAL ORAL
Status: DISCONTINUED | OUTPATIENT
Start: 2024-08-22 | End: 2024-08-22 | Stop reason: HOSPADM

## 2024-08-22 RX ADMIN — FENTANYL CITRATE 50 MCG: 50 INJECTION, SOLUTION INTRAMUSCULAR; INTRAVENOUS at 13:35

## 2024-08-22 RX ADMIN — SODIUM CHLORIDE, POTASSIUM CHLORIDE, SODIUM LACTATE AND CALCIUM CHLORIDE: 600; 310; 30; 20 INJECTION, SOLUTION INTRAVENOUS at 12:28

## 2024-08-22 RX ADMIN — PROPOFOL 50 MG: 10 INJECTION, EMULSION INTRAVENOUS at 13:17

## 2024-08-22 RX ADMIN — LIDOCAINE HYDROCHLORIDE 50 MG: 20 INJECTION, SOLUTION EPIDURAL; INFILTRATION; INTRACAUDAL at 13:16

## 2024-08-22 RX ADMIN — FENTANYL CITRATE 50 MCG: 50 INJECTION, SOLUTION INTRAMUSCULAR; INTRAVENOUS at 13:16

## 2024-08-22 RX ADMIN — MIDAZOLAM HYDROCHLORIDE 2 MG: 2 INJECTION, SOLUTION INTRAMUSCULAR; INTRAVENOUS at 13:11

## 2024-08-22 RX ADMIN — DEXAMETHASONE SODIUM PHOSPHATE 4 MG: 4 INJECTION INTRA-ARTICULAR; INTRALESIONAL; INTRAMUSCULAR; INTRAVENOUS; SOFT TISSUE at 13:17

## 2024-08-22 RX ADMIN — FENTANYL CITRATE 50 MCG: 50 INJECTION, SOLUTION INTRAMUSCULAR; INTRAVENOUS at 13:55

## 2024-08-22 RX ADMIN — TRANEXAMIC ACID 1000 MG: 100 INJECTION, SOLUTION INTRAVENOUS at 13:17

## 2024-08-22 RX ADMIN — PROPOFOL 200 MG: 10 INJECTION, EMULSION INTRAVENOUS at 13:16

## 2024-08-22 RX ADMIN — ONDANSETRON 4 MG: 2 INJECTION INTRAMUSCULAR; INTRAVENOUS at 14:02

## 2024-08-22 RX ADMIN — KETOROLAC TROMETHAMINE 15 MG: 15 INJECTION, SOLUTION INTRAMUSCULAR; INTRAVENOUS at 14:02

## 2024-08-22 RX ADMIN — CEFAZOLIN 2 G: 1 INJECTION, POWDER, FOR SOLUTION INTRAMUSCULAR; INTRAVENOUS at 13:17

## 2024-08-22 ASSESSMENT — PAIN DESCRIPTION - PAIN TYPE
TYPE: SURGICAL PAIN
TYPE: SURGICAL PAIN

## 2024-08-22 ASSESSMENT — FIBROSIS 4 INDEX: FIB4 SCORE: 0.93

## 2024-08-22 NOTE — DISCHARGE INSTRUCTIONS
See attached folder for additional discharge instructions from Dr. Crowder.     ACTIVITY: Rest and take it easy for the first 24 hours.  A responsible adult is recommended to remain with you during that time.  It is normal to feel sleepy.  We encourage you to not do anything that requires balance, judgment or coordination.    MILD FLU-LIKE SYMPTOMS ARE NORMAL. YOU MAY EXPERIENCE GENERALIZED MUSCLE ACHES, THROAT IRRITATION, HEADACHE AND/OR SOME NAUSEA.    FOR 24 HOURS DO NOT:  Drive, operate machinery or run household appliances.  Drink beer or alcoholic beverages.   Make important decisions or sign legal documents.    DIET: To avoid nausea, slowly advance diet as tolerated, avoiding spicy or greasy foods for the first day.  Add more substantial food to your diet according to your physician's instructions. INCREASE FLUIDS AND FIBER TO AVOID CONSTIPATION.    FOLLOW-UP APPOINTMENT:  Call to schedule.    You should CALL YOUR PHYSICIAN if you develop:  Fever greater than 101 degrees F.  Pain not relieved by medication, or persistent nausea or vomiting.  Excessive bleeding (blood soaking through dressing) or unexpected drainage from the wound.  Extreme redness or swelling around the incision site, drainage of pus or foul smelling drainage.  Inability to urinate or empty your bladder within 8 hours.  Problems with breathing or chest pain.    You should call 911 if you develop problems with breathing or chest pain.  If you are unable to contact your doctor or surgical center, you should go to the nearest emergency room or urgent care center.  Physician's telephone #: [phone]    If any questions arise, call your doctor.  If your doctor is not available, please feel free to call the Surgical Center at [phone].     A registered nurse may call you a few days after your surgery to see how you are doing after your procedure.    MEDICATIONS: Resume taking daily medication.  Take prescribed pain medication with food.  If no  medication is prescribed, you may take non-aspirin pain medication if needed.  PAIN MEDICATION CAN BE VERY CONSTIPATING.  Take a stool softener or laxative such as senokot, pericolace, or milk of magnesia if needed.    Last pain medication given at _________________________.    If your physician has prescribed pain medication that includes Acetaminophen (Tylenol), do not take additional Acetaminophen (Tylenol) while taking the prescribed medication.

## 2024-08-22 NOTE — ANESTHESIA PROCEDURE NOTES
Airway    Date/Time: 8/22/2024 1:16 PM    Performed by: Melvin Dubon M.D.  Authorized by: Melvin Dubon M.D.    Location:  OR  Urgency:  Elective  Indications for Airway Management:  Anesthesia      Spontaneous Ventilation: absent    Sedation Level:  Deep  Preoxygenated: Yes    Mask Difficulty Assessment:  0 - not attempted  Final Airway Type:  Supraglottic airway  Final Supraglottic Airway:  Standard LMA    SGA Size:  4  Number of Attempts at Approach:  1

## 2024-08-22 NOTE — ANESTHESIA PREPROCEDURE EVALUATION
Case: [id number] Date/Time: 08/22/24 1315    Procedure: LEFT KNEE, PARTIAL MEDIAL MENISCECTOMY, POSSIBLE HYBRID MEDIAL MENISCAL REPAIR AND RELATED PROCEDURES    Pre-op diagnosis: S83.242A    Location:  OR  / SURGERY [facility]    Surgeons: Marko Crowder M.D.            Relevant Problems   ANESTHESIA (within normal limits)      PULMONARY   (positive) Dyspnea on exertion      NEURO (within normal limits)      CARDIAC   (positive) Dyspnea on exertion      GI (within normal limits)       (within normal limits)      ENDO (within normal limits)      Other   (positive) Chronic right-sided low back pain with right-sided sciatica   (positive) Prediabetes       Physical Exam    Airway   Mallampati: II  TM distance: >3 FB  Neck ROM: full       Cardiovascular - normal exam  Rhythm: regular  Rate: normal  (-) murmur     Dental - normal exam           Pulmonary - normal exam  Breath sounds clear to auscultation     Abdominal    Neurological - normal exam                 Anesthesia Plan    ASA 2       Plan - general       Airway plan will be LMA          Induction: intravenous    Postoperative Plan: Postoperative administration of opioids is intended.    Pertinent diagnostic labs and testing reviewed    Informed Consent:    Anesthetic plan and risks discussed with patient.    Use of blood products discussed with: patient whom consented to blood products.

## 2024-08-22 NOTE — OR NURSING
1438: Report received from Migue NORWOOD RN. Pt remains sleeping at this time. Stable on 10L mask.     1445: No change.    1455: Pt denies pain and nausea. Pt returned to sleep.    1500: Updated pt friend via phone.    1515: Pt remains sleeping at this time. Stable on RA.    1530: Pt awake, denies pain and nausea. Stable on RA. Meets criteria to transfer to Stage 2.

## 2024-08-22 NOTE — OR NURSING
1415: To PACU from OR, report received from anesthesia and RN. Patient unresponsive to verbal stimuli, respirations are spontaneous and unlabored. VSS on 10L. OPA present. Dressing is CDI, left knee brace in place. Cold pack applied. CMS: 2+ pedal pulses, brisk cap refill present, warm to touch.    1430: Patient remains asleep with OPA in place. No change to surgical site assessment.     1436: Patient arousable, OPA removed.     1438: Report to ESTELLE Vivar.

## 2024-08-22 NOTE — OR SURGEON
Immediate Post OP Note    PreOp Diagnosis: left knee medial meniscal tear      PostOp Diagnosis: Same      Procedure(s):  LEFT KNEE, PARTIAL MEDIAL MENISCECTOMY, POSSIBLE HYBRID MEDIAL MENISCAL REPAIR AND RELATED PROCEDURES - Wound Class: Clean    Surgeon(s):  Marko Crowder M.D.    Anesthesiologist/Type of Anesthesia:  Anesthesiologist: Melvin Dubon M.D./General    Surgical Staff:  Circulator: Gaetano Tejeda R.N.; Jean Marie Cifuentes R.N.  Scrub Person: Yarely Ko    Specimens removed if any:  * No specimens in log *    Estimated Blood Loss: Minimal     Findings: As above    Complications: None apparent        8/22/2024 2:16 PM Marko Crowder M.D.

## 2024-08-22 NOTE — ANESTHESIA TIME REPORT
Anesthesia Start and Stop Event Times       Date Time Event    8/22/2024 1300 Ready for Procedure     1311 Anesthesia Start     1417 Anesthesia Stop          Responsible Staff  08/22/24      Name Role Begin End    Melvin Dubon M.D. Anesth 1311 1417          Overtime Reason:  no overtime (within assigned shift)    Comments:

## 2024-08-22 NOTE — OR NURSING
1540: Patient arrived to phase 2. Patient changed out of surgical gown into clothes. Patient is A&Ox4. Patient reports no pain and no nausea. Vital signs taken and patient assessed. Asked the patient if they had to use the bathroom.    1630: IV removed and discharge instructions read. All questions answered. Discharged to care of responsible adult via wheel chair by CNA.

## 2024-08-22 NOTE — OP REPORT
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    SURGEON: Marko Crowder MD.     ASSISTANT:  Eron Leon MD    expert assistance was medically necessary for manipulation of the limb, manipulation of multiple instruments at one time, as well as to prevent damage to neurovascular structures. All critical portions of procedure were performed by myself.    ANESTHESIOLOGIST: MD Ling    ANESTHESIA: General plus intraarticular local anesthetic.    COMPLEXITY:  Normal.    DEVICES AND IMPLANTS:  Won AIR meniscal repair device x 3    IMPLANT SHEET REVIEWED:  N/A.    ESTIMATED BLOOD LOSS:  5 mL    SPECIMEN REMOVED:  None.      BLOOD ADMINISTERED:  None.    TOURNIQUET TIME: 35 minutes.    INDICATIONS:  The patient is a 33 year old male with a history of knee pain which has been unresponsive to conservative management. They were seen in clinic. An MRI was obtained which revealed complex medial meniscal tear. We discussed continuing nonoperative management versus operative management. The patient elected to proceed with operative management. For detailed discussion of risks, benefits, and alternatives, please see the orthopedic clinic notes.    We reviewed today the usual risks of arthroscopy, including bleeding, damage to neurovascular structures, postoperative stiffness, persistent pain, degenerative joint changes which  may be progressive and require further treatment, wound healing complications, infection and development of a new or exacerbation of an existing medical comorbidity. We reviewed specifically the signs and symptoms of venous thromboembolic disease.     DESCRIPTION OF PROCEDURE:      On the date of surgery, the patient was identified in the preoperative holding area.  Surgical site was agreed upon, confirmed, and marked by the surgery team, nursing staff and the patient themself.  I marked the operative side. They were taken to the operating room and a surgical time-out was performed. They were positioned supine on the operating table with attention paid to padding all bony prominences. An anesthetic was administered by anesthesia staff. The limb was prepped and draped in the usual sterile fashion after a tourniquet was applied over soft padding. They received antibiotic prophylaxis within 30 minutes of incision and mechanical DVT prophylaxis to the nonoperative leg.    Attention was first turned to the diagnostic portion of the procedure.    Examination under anesthesia was performed which revealed stable exam to anterior and posterior drawer, Lachman, pivot shift and varus and valgus stress. There was full range of motion.    Diagnostic arthroscopy was then undertaken. The tourniquet was inflated and portal sites were marked utilizing anatomic landmarks.  A lateral viewing portal was established and then a medial working portal was established under direct visualization.  A probe was introduced and all structures were thoroughly probed and evaluated for pathology. Results of the diagnostic arthroscopy are as follows:     Suprapatellar pouch normal  Patella chondromalacia  Trochlea normal   Medial femoral condyle normal  Medial tibial plateau normal  Lateral femoral condyle normal  Lateral tibial plateau normal  Medial meniscus complex degenerative tear posterior horn with parrot-beak and horizontal cleavage  component  Lateral meniscus normal   Medial gutter normal  Lateral gutter normal  Notch normal  ACL normal   PCL normal  Posterior knee parameniscal cyst posterior medially    Attention was then turned to the therapeutic portion of the arthroscopic procedure.    A curved shaver was introduced into the knee. Combination of shaver and biter were then utilized to perform a meniscectomy removing enough meniscus to leave a stable base. Loose meniscal pieces were removed. Total meniscus resected at deepest depth of resection was only the peripheral 30% leaving a large horizontal cleavage type tear and associated parameniscal cyst.     I elected to approach the residual horizontal cleavage with repair a rasp was introduced and the shaver was introduced and a limited synovectomy was performed to decompress the parameniscal cyst a series of 3 vertical mattress sutures were then placed tensioned and cut completing the repair along the posterior horn of the horizontal cleavage tear    Chondroplasty was performed with a mechanized shaver of the chondral damage noted above.    Limited synovectomy was performed as above to excise the parameniscal cyst    The knee was copiously lavaged.  The arthroscope was removed.  The portals were closed with 3-0 inverted figure-of-eight sutures.  Xeroform and a sterile dressing were placed.  The tourniquet was deflated after application of an Ace wrap for compression.  The patient was awakened from anesthesia and taken to recovery room in good condition.    POSTOPERATIVE PLAN:  Date of discharge protocol with narcotics and antiemetics.  Early ambulation and mechanical compression for DVT prevention, crutches as needed for 1-2 days.  Accelerated meniscal repair protocol weight-bear as tolerated locked in extension for 4 weeks with range of motion 0 to 90 degrees then begin gait training and gradually progress to full range of motion and wean from crutches and brace.  Begin physical therapy  earlier this week.  Follow up in clinic in 2 weeks for removal of sutures and to review arthroscopic findings.

## 2024-08-25 ASSESSMENT — PAIN SCALES - GENERAL: PAIN_LEVEL: 0

## 2024-08-25 NOTE — ANESTHESIA POSTPROCEDURE EVALUATION
Patient: Miguel Rolon    Procedure Summary       Date: 08/22/24 Room / Location:  OR  / SURGERY [facility]    Anesthesia Start: 1311 Anesthesia Stop: 1417    Procedure: LEFT KNEE, PARTIAL MEDIAL MENISCECTOMY, POSSIBLE HYBRID MEDIAL MENISCAL REPAIR AND RELATED PROCEDURES (Left: Knee) Diagnosis: (S83.242A)    Surgeons: Marko Crowder M.D. Responsible Provider: Melvin Dubon M.D.    Anesthesia Type: general ASA Status: 2            Final Anesthesia Type: general  Last vitals  BP 96/42       Temp 36.1       Pulse 60      Resp 18       SpO2 99         Anesthesia Post Evaluation    Patient location during evaluation: PACU  Patient participation: complete - patient participated  Level of consciousness: awake and alert  Pain score: 0    Airway patency: patent  Anesthetic complications: no  Cardiovascular status: hemodynamically stable  Respiratory status: acceptable  Hydration status: euvolemic    PONV: none          There were no known notable events for this encounter.     Nurse Pain Score: 0 (NPRS)

## 2025-02-03 ENCOUNTER — APPOINTMENT (OUTPATIENT)
Dept: MEDICAL GROUP | Facility: MEDICAL CENTER | Age: 35
End: 2025-02-03
Payer: COMMERCIAL

## 2025-02-03 ENCOUNTER — HOSPITAL ENCOUNTER (OUTPATIENT)
Facility: MEDICAL CENTER | Age: 35
End: 2025-02-03
Attending: PHYSICIAN ASSISTANT
Payer: COMMERCIAL

## 2025-02-03 VITALS
TEMPERATURE: 98 F | HEART RATE: 50 BPM | DIASTOLIC BLOOD PRESSURE: 60 MMHG | HEIGHT: 74 IN | OXYGEN SATURATION: 98 % | SYSTOLIC BLOOD PRESSURE: 104 MMHG | WEIGHT: 232 LBS | BODY MASS INDEX: 29.77 KG/M2

## 2025-02-03 DIAGNOSIS — Z00.00 PREVENTATIVE HEALTH CARE: ICD-10-CM

## 2025-02-03 DIAGNOSIS — L65.9 HAIR LOSS: ICD-10-CM

## 2025-02-03 DIAGNOSIS — G89.29 CHRONIC PAIN OF RIGHT KNEE: ICD-10-CM

## 2025-02-03 DIAGNOSIS — M25.561 CHRONIC PAIN OF RIGHT KNEE: ICD-10-CM

## 2025-02-03 PROBLEM — R73.03 PREDIABETES: Status: RESOLVED | Noted: 2022-02-02 | Resolved: 2025-02-03

## 2025-02-03 LAB
ALBUMIN SERPL BCP-MCNC: 4.2 G/DL (ref 3.2–4.9)
ALBUMIN/GLOB SERPL: 1.3 G/DL
ALP SERPL-CCNC: 64 U/L (ref 30–99)
ALT SERPL-CCNC: 19 U/L (ref 2–50)
ANION GAP SERPL CALC-SCNC: 8 MMOL/L (ref 7–16)
AST SERPL-CCNC: 26 U/L (ref 12–45)
BILIRUB SERPL-MCNC: 0.3 MG/DL (ref 0.1–1.5)
BUN SERPL-MCNC: 15 MG/DL (ref 8–22)
CALCIUM ALBUM COR SERPL-MCNC: 9 MG/DL (ref 8.5–10.5)
CALCIUM SERPL-MCNC: 9.2 MG/DL (ref 8.5–10.5)
CHLORIDE SERPL-SCNC: 106 MMOL/L (ref 96–112)
CHOLEST SERPL-MCNC: 169 MG/DL (ref 100–199)
CO2 SERPL-SCNC: 26 MMOL/L (ref 20–33)
CREAT SERPL-MCNC: 1.14 MG/DL (ref 0.5–1.4)
ERYTHROCYTE [DISTWIDTH] IN BLOOD BY AUTOMATED COUNT: 44.8 FL (ref 35.9–50)
EST. AVERAGE GLUCOSE BLD GHB EST-MCNC: 117 MG/DL
FASTING STATUS PATIENT QL REPORTED: NORMAL
GFR SERPLBLD CREATININE-BSD FMLA CKD-EPI: 85 ML/MIN/1.73 M 2
GLOBULIN SER CALC-MCNC: 3.3 G/DL (ref 1.9–3.5)
GLUCOSE SERPL-MCNC: 103 MG/DL (ref 65–99)
HBA1C MFR BLD: 5.7 % (ref 4–5.6)
HCT VFR BLD AUTO: 48.2 % (ref 42–52)
HDLC SERPL-MCNC: 40 MG/DL
HGB BLD-MCNC: 15.6 G/DL (ref 14–18)
LDLC SERPL CALC-MCNC: 117 MG/DL
MCH RBC QN AUTO: 29.7 PG (ref 27–33)
MCHC RBC AUTO-ENTMCNC: 32.4 G/DL (ref 32.3–36.5)
MCV RBC AUTO: 91.8 FL (ref 81.4–97.8)
PLATELET # BLD AUTO: 196 K/UL (ref 164–446)
PMV BLD AUTO: 11.7 FL (ref 9–12.9)
POTASSIUM SERPL-SCNC: 4.8 MMOL/L (ref 3.6–5.5)
PROT SERPL-MCNC: 7.5 G/DL (ref 6–8.2)
RBC # BLD AUTO: 5.25 M/UL (ref 4.7–6.1)
SODIUM SERPL-SCNC: 140 MMOL/L (ref 135–145)
TRIGL SERPL-MCNC: 60 MG/DL (ref 0–149)
TSH SERPL DL<=0.005 MIU/L-ACNC: 0.87 UIU/ML (ref 0.38–5.33)
WBC # BLD AUTO: 4.7 K/UL (ref 4.8–10.8)

## 2025-02-03 PROCEDURE — 83036 HEMOGLOBIN GLYCOSYLATED A1C: CPT

## 2025-02-03 PROCEDURE — 80061 LIPID PANEL: CPT

## 2025-02-03 PROCEDURE — 36415 COLL VENOUS BLD VENIPUNCTURE: CPT

## 2025-02-03 PROCEDURE — 80053 COMPREHEN METABOLIC PANEL: CPT

## 2025-02-03 PROCEDURE — 99214 OFFICE O/P EST MOD 30 MIN: CPT | Performed by: PHYSICIAN ASSISTANT

## 2025-02-03 PROCEDURE — 3074F SYST BP LT 130 MM HG: CPT | Performed by: PHYSICIAN ASSISTANT

## 2025-02-03 PROCEDURE — 84443 ASSAY THYROID STIM HORMONE: CPT

## 2025-02-03 PROCEDURE — 85027 COMPLETE CBC AUTOMATED: CPT

## 2025-02-03 PROCEDURE — 3078F DIAST BP <80 MM HG: CPT | Performed by: PHYSICIAN ASSISTANT

## 2025-02-03 ASSESSMENT — PATIENT HEALTH QUESTIONNAIRE - PHQ9: CLINICAL INTERPRETATION OF PHQ2 SCORE: 0

## 2025-02-03 ASSESSMENT — FIBROSIS 4 INDEX: FIB4 SCORE: .954984023580561083

## 2025-02-03 NOTE — PROGRESS NOTES
Subjective:     History of Present Illness  The patient presents for evaluation of hair loss, right knee pain, and prediabetes.    He has observed patchy hair loss, which he first noticed after a haircut in [place]. He has been applying various oils to the affected areas and has been informed that the condition could be stress-related. He also reports a slower hair growth rate compared to his older brother.    He underwent a left knee meniscus repair surgery last year. Currently, he is experiencing pain in his right knee, which he suspects might be due to arthritis. He has been focusing on weight loss for the past 2 years, but the pain has hindered his progress. Prior to his surgery, he weighed 217 pounds. He recently resumed running and jogging, which has increased the pressure on his knee. The onset of his knee issues was this year, with the pain intensifying 3 weeks ago. However, he reports no current pain. He has been engaging in weightlifting exercises and walking approximately 3 to 5 miles daily.    He wants to check his blood sugar levels as it was normal during his last visit.      Current medicines (including changes today)  Current Outpatient Medications   Medication Sig Dispense Refill    diphenhydrAMINE-APAP, sleep, (TYLENOL PM EXTRA STRENGTH)  MG Tab Take 1-2 Tablets by mouth at bedtime as needed (pain).     MEDICATION INSTRUCTIONS FOR SURGERY/PROCEDURE SCHEDULED FOR 8/22   CONTINUE TAKING MED PRIOR TO SURGERY      multivitamin Tab Take 1 Tablet by mouth every day.     MEDICATION INSTRUCTIONS FOR SURGERY/PROCEDURE SCHEDULED FOR 8/22   DO NOT TAKE 7 DAYS PRIOR TO SURGERY      Probiotic Product (PROBIOTIC-10 ULTIMATE) Cap Take 1 Tablet by mouth every day.     MEDICATION INSTRUCTIONS FOR SURGERY/PROCEDURE SCHEDULED FOR 8/22   DO NOT TAKE 7 DAYS PRIOR TO SURGERY      FIBER ADULT GUMMIES PO Take 1 Tablet by mouth every day.     MEDICATION INSTRUCTIONS FOR SURGERY/PROCEDURE SCHEDULED FOR 8/22   DO NOT  "TAKE 7 DAYS PRIOR TO SURGERY       No current facility-administered medications for this visit.     He  has a past medical history of Bowel habit changes, Breath shortness, Pain, Pneumonia, PONV (postoperative nausea and vomiting), and Prediabetes.    ROS   No chest pain, no shortness of breath, no abdominal pain  Positive ROS as per HPI.  All other systems reviewed and are negative.     Objective:     /60 (BP Location: Left arm, Patient Position: Sitting, BP Cuff Size: Adult)   Pulse (!) 50   Temp 36.7 °C (98 °F) (Temporal)   Ht 1.88 m (6' 2\")   Wt 105 kg (232 lb)   SpO2 98%  Body mass index is 29.79 kg/m².   Physical Exam    Constitutional: Alert, no distress.  Skin: Warm, dry, good turgor, no rashes in visible areas.  Eye: Equal, round and reactive, conjunctiva clear, lids normal.  ENMT: Lips without lesions, good dentition, oropharynx clear.  Neck: Trachea midline, no masses, no thyromegaly.   Psych: Alert and oriented x3, normal affect and mood.      Results  Laboratory Studies  A1c was 5.6 last summer.        Assessment and Plan:   The following treatment plan was discussed    Assessment & Plan  1. Hair loss.  Acute condition.  The etiology of the hair loss remains uncertain, with potential causes including stress or alopecia. There are no significant patches devoid of hair follicles, which could indicate a fungal infection. He has been advised to monitor his scalp for any changes. A prescription for finasteride, a total of 90 tablets, has been recommended, with the understanding that it may take some time to observe any potential benefits.  He declined though.  Should the hair loss persist, a referral to a dermatologist will be considered.    2. Right knee pain.  Chronic condition with recent exacerbation.  A referral to Dr. Crowder at [facility] has been made for further evaluation of the knee pain. An x-ray will likely be performed to assess the condition of the knee. He has been instructed to contact " [facility] within a 10-day period if he does not receive a call from them.    3.  History of prediabetes.  His A1c level was recorded as 5.6 during the previous summer, which is within the normal range but slightly elevated. It is better than the previous value of 6. A comprehensive set of laboratory tests will be conducted today, including complete blood count, liver function, kidney function, cholesterol, A1c, and thyroid level checks. He has been advised to fast for a period of 8 to 10 hours prior to the tests.    PROCEDURE  The patient underwent a left knee meniscus repair surgery last year.      ORDERS:  1. Hair loss      2. Chronic pain of right knee    - Referral to Orthopedics    3. Preventative health care    - CBC WITHOUT DIFFERENTIAL; Future  - Comp Metabolic Panel; Future  - Lipid Profile; Future  - HEMOGLOBIN A1C; Future  - TSH WITH REFLEX TO FT4; Future        Please note that this dictation was created using voice recognition software. I have made every reasonable attempt to correct obvious errors, but I expect that there are errors of grammar and possibly content that I did not discover before finalizing the note.      Attestation      Verbal consent was acquired by the patient to use [employer] ambient listening note generation during this visit Yes
